# Patient Record
Sex: FEMALE | Race: WHITE | NOT HISPANIC OR LATINO | URBAN - METROPOLITAN AREA
[De-identification: names, ages, dates, MRNs, and addresses within clinical notes are randomized per-mention and may not be internally consistent; named-entity substitution may affect disease eponyms.]

---

## 2017-03-24 ENCOUNTER — OUTPATIENT (OUTPATIENT)
Dept: OUTPATIENT SERVICES | Facility: HOSPITAL | Age: 62
LOS: 1 days | End: 2017-03-24
Payer: COMMERCIAL

## 2017-03-24 VITALS
RESPIRATION RATE: 18 BRPM | OXYGEN SATURATION: 98 % | DIASTOLIC BLOOD PRESSURE: 86 MMHG | HEIGHT: 65 IN | SYSTOLIC BLOOD PRESSURE: 130 MMHG | WEIGHT: 126.1 LBS | TEMPERATURE: 98 F | HEART RATE: 61 BPM

## 2017-03-24 DIAGNOSIS — Z98.890 OTHER SPECIFIED POSTPROCEDURAL STATES: Chronic | ICD-10-CM

## 2017-03-24 DIAGNOSIS — Z01.818 ENCOUNTER FOR OTHER PREPROCEDURAL EXAMINATION: ICD-10-CM

## 2017-03-24 DIAGNOSIS — N83.8 OTHER NONINFLAMMATORY DISORDERS OF OVARY, FALLOPIAN TUBE AND BROAD LIGAMENT: Chronic | ICD-10-CM

## 2017-03-24 DIAGNOSIS — Z84.89 FAMILY HISTORY OF OTHER SPECIFIED CONDITIONS: Chronic | ICD-10-CM

## 2017-03-24 DIAGNOSIS — K40.90 UNILATERAL INGUINAL HERNIA, WITHOUT OBSTRUCTION OR GANGRENE, NOT SPECIFIED AS RECURRENT: ICD-10-CM

## 2017-03-24 DIAGNOSIS — Z90.81 ACQUIRED ABSENCE OF SPLEEN: Chronic | ICD-10-CM

## 2017-03-24 LAB
ANION GAP SERPL CALC-SCNC: 4 MMOL/L — LOW (ref 5–17)
BUN SERPL-MCNC: 12 MG/DL — SIGNIFICANT CHANGE UP (ref 7–23)
CALCIUM SERPL-MCNC: 9.1 MG/DL — SIGNIFICANT CHANGE UP (ref 8.4–10.5)
CHLORIDE SERPL-SCNC: 103 MMOL/L — SIGNIFICANT CHANGE UP (ref 96–108)
CO2 SERPL-SCNC: 35 MMOL/L — HIGH (ref 22–31)
CREAT SERPL-MCNC: 0.7 MG/DL — SIGNIFICANT CHANGE UP (ref 0.5–1.3)
GLUCOSE SERPL-MCNC: 107 MG/DL — HIGH (ref 70–99)
HCT VFR BLD CALC: 39.8 % — SIGNIFICANT CHANGE UP (ref 34.5–45)
HGB BLD-MCNC: 13 G/DL — SIGNIFICANT CHANGE UP (ref 11.5–15.5)
MCHC RBC-ENTMCNC: 30.4 PG — SIGNIFICANT CHANGE UP (ref 27–34)
MCHC RBC-ENTMCNC: 32.6 GM/DL — SIGNIFICANT CHANGE UP (ref 32–36)
MCV RBC AUTO: 93.1 FL — SIGNIFICANT CHANGE UP (ref 80–100)
PLATELET # BLD AUTO: 340 K/UL — SIGNIFICANT CHANGE UP (ref 150–400)
POTASSIUM SERPL-MCNC: 3.7 MMOL/L — SIGNIFICANT CHANGE UP (ref 3.5–5.3)
POTASSIUM SERPL-SCNC: 3.7 MMOL/L — SIGNIFICANT CHANGE UP (ref 3.5–5.3)
RBC # BLD: 4.28 M/UL — SIGNIFICANT CHANGE UP (ref 3.8–5.2)
RBC # FLD: 14.2 % — SIGNIFICANT CHANGE UP (ref 10.3–14.5)
SODIUM SERPL-SCNC: 142 MMOL/L — SIGNIFICANT CHANGE UP (ref 135–145)
WBC # BLD: 7.3 K/UL — SIGNIFICANT CHANGE UP (ref 3.8–10.5)
WBC # FLD AUTO: 7.3 K/UL — SIGNIFICANT CHANGE UP (ref 3.8–10.5)

## 2017-03-24 PROCEDURE — 93005 ELECTROCARDIOGRAM TRACING: CPT

## 2017-03-24 PROCEDURE — 36415 COLL VENOUS BLD VENIPUNCTURE: CPT

## 2017-03-24 PROCEDURE — 93010 ELECTROCARDIOGRAM REPORT: CPT | Mod: NC

## 2017-03-24 PROCEDURE — 80048 BASIC METABOLIC PNL TOTAL CA: CPT

## 2017-03-24 PROCEDURE — 85027 COMPLETE CBC AUTOMATED: CPT

## 2017-03-24 PROCEDURE — G0463: CPT

## 2017-03-24 RX ORDER — CLONAZEPAM 1 MG
1 TABLET ORAL
Qty: 0 | Refills: 0 | COMMUNITY

## 2017-03-24 NOTE — H&P PST ADULT - PMH
Acquired hypothyroidism    Anxiety    Hemochromatosis  last-2 months ago  Hiatal hernia    Inguinal hernia  right

## 2017-03-24 NOTE — H&P PST ADULT - NSANTHOSAYNRD_GEN_A_CORE
No. BELEM screening performed.  STOP BANG Legend: 0-2 = LOW Risk; 3-4 = INTERMEDIATE Risk; 5-8 = HIGH Risk

## 2017-03-24 NOTE — H&P PST ADULT - PROBLEM SELECTOR PLAN 1
scheduled open repair-right inguinal hernia repair-4/6/17  obtaining medical clearance  pre op instructions provided

## 2017-03-24 NOTE — H&P PST ADULT - PSH
Cyst of fallopian tube  excision 2012  Family history of breast cyst  bilateral removed-benign  History of splenectomy  s/p tumor  S/P arthroscopy of knee  bilateral

## 2017-03-31 RX ORDER — BUPIVACAINE HCL/PF 7.5 MG/ML
100 VIAL (ML) INJECTION
Qty: 0 | Refills: 0 | Status: DISCONTINUED | OUTPATIENT
Start: 2017-04-06 | End: 2017-04-06

## 2017-03-31 NOTE — ASU DISCHARGE PLAN (ADULT/PEDIATRIC). - INSTRUCTIONS
REST! Apply ice packs to incision sites 20 mins on, 20 mins off every 4 hours for first 24 hours.  If taking narcotic pain medications, may take COLACE (OTC stool softener) as directed to prevent constipation. Go with what you know! REST!  Apply ice packs to incision sites 30 mins on, 30 mins off every hour while awake for next 48 hours.

## 2017-03-31 NOTE — ASU DISCHARGE PLAN (ADULT/PEDIATRIC). - FOLLOWUP APPOINTMENT CLINIC/PHYSICIAN
Please call Dr. MG Holt's office ([phone]) to schedule a follow-up appointment to be seen in 7-10 days. Please call Dr. MG Holt's office ([phone]) to schedule a follow-up appointment to be seen in 2 weeks.

## 2017-03-31 NOTE — ASU DISCHARGE PLAN (ADULT/PEDIATRIC). - DRESSING FT
Remove pain pump catheter when empty (in 48 hours) and apply band-aid to insertion site. Please remove OUTER dressing WITH pump BEFORE first shower in 48 hours.

## 2017-03-31 NOTE — ASU DISCHARGE PLAN (ADULT/PEDIATRIC). - NOTIFY
Pain not relieved by Medications/Persistent Nausea and Vomiting/Fever greater than 101/Unable to Urinate/Bleeding that does not stop/Swelling that continues/Inability to Tolerate Liquids or Foods/Increased Irritability or Sluggishness

## 2017-03-31 NOTE — ASU DISCHARGE PLAN (ADULT/PEDIATRIC). - MEDICATION SUMMARY - MEDICATIONS TO TAKE
I will START or STAY ON the medications listed below when I get home from the hospital:    oxyCODONE 5 mg oral tablet  -- 1 tab(s) by mouth every 4 hours, As Needed MDD:6  -- Indication: For Treatment of incisional/ surgical site pain.    clonazePAM 0.5 mg oral tablet  -- 2 tab(s) by mouth 2 times a day, As Needed  -- Indication: For Continuation of home medication.    FLUoxetine 40 mg oral capsule  -- 1 cap(s) by mouth once a day  -- Indication: For Continuation of home medication.    NexIUM 40 mg oral delayed release capsule  -- 1 cap(s) by mouth once a day  -- Indication: For Continuation of home medication.    levothyroxine 125 mcg (0.125 mg) oral tablet  -- 1 tab(s) by mouth once a day  -- Indication: For Continuation of home medication.

## 2017-03-31 NOTE — ASU DISCHARGE PLAN (ADULT/PEDIATRIC). - SPECIAL INSTRUCTIONS
REST!  Ice packs, as needed.  May take Tylenol for minor discomfort.  Please avoid Aspirin, Advil, Motrin for next 48 hours.  A script for pain medication has been forwarded to your pharmacy.  Please take an over the counter stool softener such as COLACE twice daily until seen in office for follow-up.

## 2017-04-05 NOTE — ASU PATIENT PROFILE, ADULT - PSH
Cyst of fallopian tube  excision 2012  Family history of breast cyst  bilateral removed-benign  History of partial pancreatectomy    History of splenectomy  s/p tumor  S/P arthroscopy of knee  bilateral

## 2017-04-06 ENCOUNTER — OUTPATIENT (OUTPATIENT)
Dept: OUTPATIENT SERVICES | Facility: HOSPITAL | Age: 62
LOS: 1 days | End: 2017-04-06
Payer: COMMERCIAL

## 2017-04-06 VITALS
SYSTOLIC BLOOD PRESSURE: 109 MMHG | HEART RATE: 74 BPM | DIASTOLIC BLOOD PRESSURE: 54 MMHG | RESPIRATION RATE: 18 BRPM | OXYGEN SATURATION: 95 %

## 2017-04-06 VITALS
WEIGHT: 118.83 LBS | DIASTOLIC BLOOD PRESSURE: 65 MMHG | RESPIRATION RATE: 15 BRPM | HEART RATE: 58 BPM | TEMPERATURE: 98 F | HEIGHT: 65 IN | OXYGEN SATURATION: 100 % | SYSTOLIC BLOOD PRESSURE: 107 MMHG

## 2017-04-06 DIAGNOSIS — Z84.89 FAMILY HISTORY OF OTHER SPECIFIED CONDITIONS: Chronic | ICD-10-CM

## 2017-04-06 DIAGNOSIS — Z98.890 OTHER SPECIFIED POSTPROCEDURAL STATES: Chronic | ICD-10-CM

## 2017-04-06 DIAGNOSIS — Z90.411 ACQUIRED PARTIAL ABSENCE OF PANCREAS: Chronic | ICD-10-CM

## 2017-04-06 DIAGNOSIS — N83.8 OTHER NONINFLAMMATORY DISORDERS OF OVARY, FALLOPIAN TUBE AND BROAD LIGAMENT: Chronic | ICD-10-CM

## 2017-04-06 DIAGNOSIS — Z90.81 ACQUIRED ABSENCE OF SPLEEN: Chronic | ICD-10-CM

## 2017-04-06 DIAGNOSIS — K40.90 UNILATERAL INGUINAL HERNIA, WITHOUT OBSTRUCTION OR GANGRENE, NOT SPECIFIED AS RECURRENT: ICD-10-CM

## 2017-04-06 PROCEDURE — 49505 PRP I/HERN INIT REDUC >5 YR: CPT | Mod: RT

## 2017-04-06 PROCEDURE — C1781: CPT

## 2017-04-06 RX ORDER — SODIUM CHLORIDE 9 MG/ML
1000 INJECTION, SOLUTION INTRAVENOUS
Qty: 0 | Refills: 0 | Status: DISCONTINUED | OUTPATIENT
Start: 2017-04-06 | End: 2017-04-07

## 2017-04-06 RX ORDER — HYDROMORPHONE HYDROCHLORIDE 2 MG/ML
0.5 INJECTION INTRAMUSCULAR; INTRAVENOUS; SUBCUTANEOUS
Qty: 0 | Refills: 0 | Status: DISCONTINUED | OUTPATIENT
Start: 2017-04-06 | End: 2017-04-07

## 2017-04-06 RX ORDER — KETOROLAC TROMETHAMINE 30 MG/ML
30 SYRINGE (ML) INJECTION ONCE
Qty: 0 | Refills: 0 | Status: DISCONTINUED | OUTPATIENT
Start: 2017-04-06 | End: 2017-04-07

## 2017-04-06 RX ORDER — CEFAZOLIN SODIUM 1 G
2000 VIAL (EA) INJECTION ONCE
Qty: 0 | Refills: 0 | Status: COMPLETED | OUTPATIENT
Start: 2017-04-06 | End: 2017-04-06

## 2017-04-06 RX ORDER — OXYCODONE HYDROCHLORIDE 5 MG/1
1 TABLET ORAL
Qty: 30 | Refills: 0 | OUTPATIENT
Start: 2017-04-06

## 2017-04-07 ENCOUNTER — TRANSCRIPTION ENCOUNTER (OUTPATIENT)
Age: 62
End: 2017-04-07

## 2017-05-11 ENCOUNTER — APPOINTMENT (OUTPATIENT)
Dept: ENDOCRINOLOGY | Facility: CLINIC | Age: 62
End: 2017-05-11

## 2017-05-11 VITALS
HEART RATE: 68 BPM | DIASTOLIC BLOOD PRESSURE: 68 MMHG | SYSTOLIC BLOOD PRESSURE: 110 MMHG | HEIGHT: 65.5 IN | WEIGHT: 123 LBS | BODY MASS INDEX: 20.25 KG/M2 | OXYGEN SATURATION: 98 %

## 2017-09-11 ENCOUNTER — APPOINTMENT (OUTPATIENT)
Dept: SURGICAL ONCOLOGY | Facility: CLINIC | Age: 62
End: 2017-09-11
Payer: COMMERCIAL

## 2017-09-11 VITALS
DIASTOLIC BLOOD PRESSURE: 64 MMHG | SYSTOLIC BLOOD PRESSURE: 108 MMHG | OXYGEN SATURATION: 97 % | RESPIRATION RATE: 16 BRPM | HEIGHT: 65.5 IN | BODY MASS INDEX: 20.25 KG/M2 | HEART RATE: 64 BPM | WEIGHT: 123 LBS

## 2017-09-11 PROCEDURE — 99214 OFFICE O/P EST MOD 30 MIN: CPT

## 2017-11-07 ENCOUNTER — OUTPATIENT (OUTPATIENT)
Dept: OUTPATIENT SERVICES | Facility: HOSPITAL | Age: 62
LOS: 1 days | End: 2017-11-07
Payer: COMMERCIAL

## 2017-11-07 DIAGNOSIS — Z90.411 ACQUIRED PARTIAL ABSENCE OF PANCREAS: Chronic | ICD-10-CM

## 2017-11-07 DIAGNOSIS — Z90.81 ACQUIRED ABSENCE OF SPLEEN: Chronic | ICD-10-CM

## 2017-11-07 DIAGNOSIS — N83.8 OTHER NONINFLAMMATORY DISORDERS OF OVARY, FALLOPIAN TUBE AND BROAD LIGAMENT: Chronic | ICD-10-CM

## 2017-11-07 DIAGNOSIS — Z84.89 FAMILY HISTORY OF OTHER SPECIFIED CONDITIONS: Chronic | ICD-10-CM

## 2017-11-07 DIAGNOSIS — Z98.890 OTHER SPECIFIED POSTPROCEDURAL STATES: Chronic | ICD-10-CM

## 2017-11-07 DIAGNOSIS — M54.16 RADICULOPATHY, LUMBAR REGION: ICD-10-CM

## 2017-11-07 PROCEDURE — 62323 NJX INTERLAMINAR LMBR/SAC: CPT

## 2017-11-07 PROCEDURE — 77003 FLUOROGUIDE FOR SPINE INJECT: CPT

## 2018-01-29 ENCOUNTER — OUTPATIENT (OUTPATIENT)
Dept: OUTPATIENT SERVICES | Facility: HOSPITAL | Age: 63
LOS: 1 days | End: 2018-01-29
Payer: COMMERCIAL

## 2018-01-29 VITALS
RESPIRATION RATE: 16 BRPM | HEIGHT: 65 IN | HEART RATE: 56 BPM | TEMPERATURE: 98 F | DIASTOLIC BLOOD PRESSURE: 68 MMHG | WEIGHT: 130.07 LBS | SYSTOLIC BLOOD PRESSURE: 113 MMHG

## 2018-01-29 DIAGNOSIS — Z90.81 ACQUIRED ABSENCE OF SPLEEN: Chronic | ICD-10-CM

## 2018-01-29 DIAGNOSIS — S83.282A OTHER TEAR OF LATERAL MENISCUS, CURRENT INJURY, LEFT KNEE, INITIAL ENCOUNTER: ICD-10-CM

## 2018-01-29 DIAGNOSIS — Z90.411 ACQUIRED PARTIAL ABSENCE OF PANCREAS: Chronic | ICD-10-CM

## 2018-01-29 DIAGNOSIS — Z98.890 OTHER SPECIFIED POSTPROCEDURAL STATES: Chronic | ICD-10-CM

## 2018-01-29 DIAGNOSIS — N83.8 OTHER NONINFLAMMATORY DISORDERS OF OVARY, FALLOPIAN TUBE AND BROAD LIGAMENT: Chronic | ICD-10-CM

## 2018-01-29 DIAGNOSIS — Z84.89 FAMILY HISTORY OF OTHER SPECIFIED CONDITIONS: Chronic | ICD-10-CM

## 2018-01-29 DIAGNOSIS — Z01.818 ENCOUNTER FOR OTHER PREPROCEDURAL EXAMINATION: ICD-10-CM

## 2018-01-29 LAB
ALBUMIN SERPL ELPH-MCNC: 3.6 G/DL — SIGNIFICANT CHANGE UP (ref 3.3–5)
ALP SERPL-CCNC: 102 U/L — SIGNIFICANT CHANGE UP (ref 40–120)
ALT FLD-CCNC: 27 U/L — SIGNIFICANT CHANGE UP (ref 12–78)
ANION GAP SERPL CALC-SCNC: 7 MMOL/L — SIGNIFICANT CHANGE UP (ref 5–17)
AST SERPL-CCNC: 29 U/L — SIGNIFICANT CHANGE UP (ref 15–37)
BILIRUB SERPL-MCNC: 0.4 MG/DL — SIGNIFICANT CHANGE UP (ref 0.2–1.2)
BUN SERPL-MCNC: 11 MG/DL — SIGNIFICANT CHANGE UP (ref 7–23)
CALCIUM SERPL-MCNC: 9 MG/DL — SIGNIFICANT CHANGE UP (ref 8.5–10.1)
CHLORIDE SERPL-SCNC: 103 MMOL/L — SIGNIFICANT CHANGE UP (ref 96–108)
CO2 SERPL-SCNC: 31 MMOL/L — SIGNIFICANT CHANGE UP (ref 22–31)
CREAT SERPL-MCNC: 0.63 MG/DL — SIGNIFICANT CHANGE UP (ref 0.5–1.3)
GLUCOSE SERPL-MCNC: 99 MG/DL — SIGNIFICANT CHANGE UP (ref 70–99)
HCT VFR BLD CALC: 38.9 % — SIGNIFICANT CHANGE UP (ref 34.5–45)
HGB BLD-MCNC: 12.5 G/DL — SIGNIFICANT CHANGE UP (ref 11.5–15.5)
MCHC RBC-ENTMCNC: 30.2 PG — SIGNIFICANT CHANGE UP (ref 27–34)
MCHC RBC-ENTMCNC: 32.1 GM/DL — SIGNIFICANT CHANGE UP (ref 32–36)
MCV RBC AUTO: 94 FL — SIGNIFICANT CHANGE UP (ref 80–100)
PLATELET # BLD AUTO: 378 K/UL — SIGNIFICANT CHANGE UP (ref 150–400)
POTASSIUM SERPL-MCNC: 3.4 MMOL/L — LOW (ref 3.5–5.3)
POTASSIUM SERPL-SCNC: 3.4 MMOL/L — LOW (ref 3.5–5.3)
PROT SERPL-MCNC: 7.4 G/DL — SIGNIFICANT CHANGE UP (ref 6–8.3)
RBC # BLD: 4.14 M/UL — SIGNIFICANT CHANGE UP (ref 3.8–5.2)
RBC # FLD: 13.1 % — SIGNIFICANT CHANGE UP (ref 10.3–14.5)
SODIUM SERPL-SCNC: 141 MMOL/L — SIGNIFICANT CHANGE UP (ref 135–145)
WBC # BLD: 5.8 K/UL — SIGNIFICANT CHANGE UP (ref 3.8–10.5)
WBC # FLD AUTO: 5.8 K/UL — SIGNIFICANT CHANGE UP (ref 3.8–10.5)

## 2018-01-29 PROCEDURE — 93010 ELECTROCARDIOGRAM REPORT: CPT | Mod: NC

## 2018-01-29 PROCEDURE — 85027 COMPLETE CBC AUTOMATED: CPT

## 2018-01-29 PROCEDURE — 93005 ELECTROCARDIOGRAM TRACING: CPT

## 2018-01-29 PROCEDURE — 80053 COMPREHEN METABOLIC PANEL: CPT

## 2018-01-29 RX ORDER — LEVOTHYROXINE SODIUM 125 MCG
1 TABLET ORAL
Qty: 0 | Refills: 0 | COMMUNITY

## 2018-01-29 RX ORDER — ESOMEPRAZOLE MAGNESIUM 40 MG/1
1 CAPSULE, DELAYED RELEASE ORAL
Qty: 0 | Refills: 0 | COMMUNITY

## 2018-01-29 RX ORDER — CLONAZEPAM 1 MG
2 TABLET ORAL
Qty: 0 | Refills: 0 | COMMUNITY

## 2018-01-29 NOTE — H&P PST ADULT - PROBLEM SELECTOR PLAN 1
scheduled for a a left knee arthroscopy - possible partial meniscectomy and possible debridement with chondroplasty on 2/9 with Dr. Rojas.

## 2018-01-29 NOTE — H&P PST ADULT - ASSESSMENT
63 yo female with lateral tear of the left knee scheduled for a left knee arthroscopy - possible partial meniscectomy and possible debridement with chondroplasty on 2/9 with Dr. Rojas.

## 2018-01-29 NOTE — H&P PST ADULT - PROBLEM SELECTOR PLAN 2
Labs CBC, CMP and EKG  MC with Sisselman  Pre op and Hibiclens instructions reviewed and given. Take routine am meds DOS with sip of water. Avoid NSAIDs and OTC supplements. Verbalized understanding

## 2018-01-29 NOTE — H&P PST ADULT - PSH
Cyst of fallopian tube  excision 2012  Family history of breast cyst  bilateral removed-benign  H/O right inguinal hernia repair  2017  History of partial pancreatectomy    History of splenectomy  s/p tumor  S/P arthroscopy of knee  bilateral

## 2018-01-29 NOTE — H&P PST ADULT - PMH
Acquired hypothyroidism    Anxiety    Hemochromatosis    Hiatal hernia    Inguinal hernia  right  Other tear of lateral meniscus, current injury, left knee, initial encounter    Pancreatic tumor Acquired hypothyroidism    Anxiety    Chronic low back pain, unspecified back pain laterality, with sciatica presence unspecified    Hemochromatosis  Last phlebotomy 3 mos ago  Hiatal hernia    Inguinal hernia  right  Lumbar herniated disc  Last epiural shot Dec 2017  Other tear of lateral meniscus, current injury, left knee, initial encounter    Pancreatic tumor    Scoliosis, unspecified scoliosis type, unspecified spinal region

## 2018-01-29 NOTE — H&P PST ADULT - HISTORY OF PRESENT ILLNESS
53 yo female with H/O stable hemocromotosis, last phlebotomy 3 mos ago, last iron level was 15, presents to PST with left knee pain. 53 yo female with H/O stable hemochromatosis last phlebotomy 3 mos ago, last iron level was 15, presents to PST with H/O left knee pain, scheduled for a a left knee arthroscopy - possible partial meniscectomy and possible debridement with chondroplasty on 2/9 with Dr. Rojas.

## 2018-02-07 DIAGNOSIS — S83.282A OTHER TEAR OF LATERAL MENISCUS, CURRENT INJURY, LEFT KNEE, INITIAL ENCOUNTER: ICD-10-CM

## 2018-02-07 DIAGNOSIS — Z01.818 ENCOUNTER FOR OTHER PREPROCEDURAL EXAMINATION: ICD-10-CM

## 2018-02-07 DIAGNOSIS — E83.119 HEMOCHROMATOSIS, UNSPECIFIED: ICD-10-CM

## 2018-02-08 RX ORDER — SODIUM CHLORIDE 9 MG/ML
1000 INJECTION, SOLUTION INTRAVENOUS
Qty: 0 | Refills: 0 | Status: DISCONTINUED | OUTPATIENT
Start: 2018-02-09 | End: 2018-02-09

## 2018-02-08 RX ORDER — ACETAMINOPHEN 500 MG
975 TABLET ORAL ONCE
Qty: 0 | Refills: 0 | Status: COMPLETED | OUTPATIENT
Start: 2018-02-09 | End: 2018-02-09

## 2018-02-08 RX ORDER — SODIUM CHLORIDE 9 MG/ML
3 INJECTION INTRAMUSCULAR; INTRAVENOUS; SUBCUTANEOUS ONCE
Qty: 0 | Refills: 0 | Status: DISCONTINUED | OUTPATIENT
Start: 2018-02-09 | End: 2018-02-24

## 2018-02-09 ENCOUNTER — RESULT REVIEW (OUTPATIENT)
Age: 63
End: 2018-02-09

## 2018-02-09 ENCOUNTER — OUTPATIENT (OUTPATIENT)
Dept: OUTPATIENT SERVICES | Facility: HOSPITAL | Age: 63
LOS: 1 days | End: 2018-02-09
Payer: COMMERCIAL

## 2018-02-09 ENCOUNTER — TRANSCRIPTION ENCOUNTER (OUTPATIENT)
Age: 63
End: 2018-02-09

## 2018-02-09 VITALS
SYSTOLIC BLOOD PRESSURE: 111 MMHG | HEART RATE: 66 BPM | RESPIRATION RATE: 14 BRPM | DIASTOLIC BLOOD PRESSURE: 49 MMHG | HEIGHT: 65 IN | TEMPERATURE: 98 F | WEIGHT: 130.07 LBS | OXYGEN SATURATION: 100 %

## 2018-02-09 VITALS — HEART RATE: 64 BPM

## 2018-02-09 DIAGNOSIS — Z84.89 FAMILY HISTORY OF OTHER SPECIFIED CONDITIONS: Chronic | ICD-10-CM

## 2018-02-09 DIAGNOSIS — Z98.890 OTHER SPECIFIED POSTPROCEDURAL STATES: Chronic | ICD-10-CM

## 2018-02-09 DIAGNOSIS — S83.282A OTHER TEAR OF LATERAL MENISCUS, CURRENT INJURY, LEFT KNEE, INITIAL ENCOUNTER: ICD-10-CM

## 2018-02-09 DIAGNOSIS — Z90.81 ACQUIRED ABSENCE OF SPLEEN: Chronic | ICD-10-CM

## 2018-02-09 DIAGNOSIS — Z90.411 ACQUIRED PARTIAL ABSENCE OF PANCREAS: Chronic | ICD-10-CM

## 2018-02-09 DIAGNOSIS — N83.8 OTHER NONINFLAMMATORY DISORDERS OF OVARY, FALLOPIAN TUBE AND BROAD LIGAMENT: Chronic | ICD-10-CM

## 2018-02-09 PROCEDURE — 88304 TISSUE EXAM BY PATHOLOGIST: CPT

## 2018-02-09 PROCEDURE — 88304 TISSUE EXAM BY PATHOLOGIST: CPT | Mod: 26

## 2018-02-09 PROCEDURE — 29880 ARTHRS KNE SRG MNISECTMY M&L: CPT | Mod: LT

## 2018-02-09 RX ORDER — CLONAZEPAM 1 MG
1 TABLET ORAL
Qty: 0 | Refills: 0 | COMMUNITY

## 2018-02-09 RX ORDER — FLUOXETINE HCL 10 MG
1 CAPSULE ORAL
Qty: 0 | Refills: 0 | COMMUNITY

## 2018-02-09 RX ORDER — HYDROMORPHONE HYDROCHLORIDE 2 MG/ML
0.5 INJECTION INTRAMUSCULAR; INTRAVENOUS; SUBCUTANEOUS
Qty: 0 | Refills: 0 | Status: DISCONTINUED | OUTPATIENT
Start: 2018-02-09 | End: 2018-02-09

## 2018-02-09 RX ORDER — ESOMEPRAZOLE MAGNESIUM 40 MG/1
1 CAPSULE, DELAYED RELEASE ORAL
Qty: 0 | Refills: 0 | COMMUNITY

## 2018-02-09 RX ORDER — KETOROLAC TROMETHAMINE 30 MG/ML
30 SYRINGE (ML) INJECTION ONCE
Qty: 0 | Refills: 0 | Status: DISCONTINUED | OUTPATIENT
Start: 2018-02-09 | End: 2018-02-09

## 2018-02-09 RX ORDER — CEFAZOLIN SODIUM 1 G
2000 VIAL (EA) INJECTION ONCE
Qty: 0 | Refills: 0 | Status: COMPLETED | OUTPATIENT
Start: 2018-02-09 | End: 2018-02-09

## 2018-02-09 RX ORDER — SODIUM CHLORIDE 9 MG/ML
1000 INJECTION, SOLUTION INTRAVENOUS
Qty: 0 | Refills: 0 | Status: DISCONTINUED | OUTPATIENT
Start: 2018-02-09 | End: 2018-02-09

## 2018-02-09 RX ORDER — LEVOTHYROXINE SODIUM 125 MCG
1 TABLET ORAL
Qty: 0 | Refills: 0 | COMMUNITY

## 2018-02-09 RX ORDER — OXYCODONE HYDROCHLORIDE 5 MG/1
5 TABLET ORAL EVERY 4 HOURS
Qty: 0 | Refills: 0 | Status: DISCONTINUED | OUTPATIENT
Start: 2018-02-09 | End: 2018-02-09

## 2018-02-09 RX ORDER — ASPIRIN/CALCIUM CARB/MAGNESIUM 324 MG
1 TABLET ORAL
Qty: 0 | Refills: 0 | COMMUNITY
Start: 2018-02-09 | End: 2018-02-23

## 2018-02-09 RX ADMIN — SODIUM CHLORIDE 30 MILLILITER(S): 9 INJECTION, SOLUTION INTRAVENOUS at 08:08

## 2018-02-09 RX ADMIN — SODIUM CHLORIDE 30 MILLILITER(S): 9 INJECTION, SOLUTION INTRAVENOUS at 08:44

## 2018-02-09 RX ADMIN — HYDROMORPHONE HYDROCHLORIDE 0.5 MILLIGRAM(S): 2 INJECTION INTRAMUSCULAR; INTRAVENOUS; SUBCUTANEOUS at 10:01

## 2018-02-09 RX ADMIN — HYDROMORPHONE HYDROCHLORIDE 0.5 MILLIGRAM(S): 2 INJECTION INTRAMUSCULAR; INTRAVENOUS; SUBCUTANEOUS at 10:28

## 2018-02-09 RX ADMIN — HYDROMORPHONE HYDROCHLORIDE 0.5 MILLIGRAM(S): 2 INJECTION INTRAMUSCULAR; INTRAVENOUS; SUBCUTANEOUS at 10:15

## 2018-02-09 RX ADMIN — Medication 975 MILLIGRAM(S): at 08:44

## 2018-02-09 RX ADMIN — HYDROMORPHONE HYDROCHLORIDE 0.5 MILLIGRAM(S): 2 INJECTION INTRAMUSCULAR; INTRAVENOUS; SUBCUTANEOUS at 10:17

## 2018-02-09 RX ADMIN — SODIUM CHLORIDE 100 MILLILITER(S): 9 INJECTION, SOLUTION INTRAVENOUS at 10:03

## 2018-02-09 RX ADMIN — Medication 30 MILLIGRAM(S): at 10:31

## 2018-02-09 NOTE — ASU PATIENT PROFILE, ADULT - PMH
Acquired hypothyroidism    Anxiety    Chronic low back pain, unspecified back pain laterality, with sciatica presence unspecified    Hemochromatosis  Last phlebotomy 3 mos ago  Hiatal hernia    Inguinal hernia  right  Lumbar herniated disc  Last epiural shot Dec 2017  Other tear of lateral meniscus, current injury, left knee, initial encounter    Pancreatic tumor    Scoliosis, unspecified scoliosis type, unspecified spinal region

## 2018-02-09 NOTE — ASU DISCHARGE PLAN (ADULT/PEDIATRIC). - ACTIVITY LEVEL
weight bearing as tolerated/no exercise/WBAT LLE/no heavy lifting WBAT LLE9 (weight bearing as tolerated)/no exercise/no heavy lifting/weight bearing as tolerated

## 2018-02-09 NOTE — ASU DISCHARGE PLAN (ADULT/PEDIATRIC). - MEDICATION SUMMARY - MEDICATIONS TO TAKE
I will START or STAY ON the medications listed below when I get home from the hospital:    aspirin 81 mg oral tablet  -- 1 tab(s) by mouth once a day for dvt prophylaxis  -- Indication: For dvt prophylaxis for 2 weeks    KlonoPIN 1 mg oral tablet  -- 1 tab(s) by mouth once a day  -- Indication: For prior home med    KlonoPIN 1 mg oral tablet  -- 1 tab(s) by mouth once a day (at bedtime)- M- W- F   -- Indication: For prior home med    FLUoxetine 40 mg oral capsule  -- 1 cap(s) by mouth once a day  -- Indication: For prior home med    NexIUM 40 mg oral delayed release capsule  -- 1 cap(s) by mouth every other day  -- Indication: For prior home med    levothyroxine 125 mcg (0.125 mg) oral tablet  -- 1 tab(s) by mouth 6 times a week- Mon- Sat  -- Indication: For prior home med

## 2018-02-12 LAB — SURGICAL PATHOLOGY FINAL REPORT - CH: SIGNIFICANT CHANGE UP

## 2018-05-24 ENCOUNTER — APPOINTMENT (OUTPATIENT)
Dept: ENDOCRINOLOGY | Facility: CLINIC | Age: 63
End: 2018-05-24
Payer: COMMERCIAL

## 2018-05-24 ENCOUNTER — APPOINTMENT (OUTPATIENT)
Dept: ENDOCRINOLOGY | Facility: CLINIC | Age: 63
End: 2018-05-24

## 2018-05-24 VITALS
OXYGEN SATURATION: 98 % | HEIGHT: 65.5 IN | HEART RATE: 79 BPM | SYSTOLIC BLOOD PRESSURE: 104 MMHG | WEIGHT: 137 LBS | DIASTOLIC BLOOD PRESSURE: 74 MMHG | BODY MASS INDEX: 22.55 KG/M2

## 2018-05-24 PROCEDURE — 99214 OFFICE O/P EST MOD 30 MIN: CPT

## 2018-05-24 RX ORDER — HYDROCODONE BITARTRATE AND ACETAMINOPHEN 5; 325 MG/1; MG/1
5-325 TABLET ORAL
Qty: 20 | Refills: 0 | Status: COMPLETED | COMMUNITY
Start: 2018-02-08

## 2018-05-29 ENCOUNTER — LABORATORY RESULT (OUTPATIENT)
Age: 63
End: 2018-05-29

## 2018-05-30 LAB
ALBUMIN SERPL ELPH-MCNC: 3.9 G/DL
ALP BLD-CCNC: 86 U/L
ALT SERPL-CCNC: 20 U/L
ANION GAP SERPL CALC-SCNC: 12 MMOL/L
AST SERPL-CCNC: 31 U/L
BASOPHILS # BLD AUTO: 0.09 K/UL
BASOPHILS NFR BLD AUTO: 1.2 %
BILIRUB SERPL-MCNC: 0.4 MG/DL
BUN SERPL-MCNC: 10 MG/DL
CALCIUM SERPL-MCNC: 9.5 MG/DL
CHLORIDE SERPL-SCNC: 100 MMOL/L
CO2 SERPL-SCNC: 27 MMOL/L
CREAT SERPL-MCNC: 0.77 MG/DL
EOSINOPHIL # BLD AUTO: 0.21 K/UL
EOSINOPHIL NFR BLD AUTO: 2.7 %
FRUCTOSAMINE SERPL-MCNC: 255 UMOL/L
GLUCOSE SERPL-MCNC: 151 MG/DL
HBA1C MFR BLD HPLC: 6.1 %
HCT VFR BLD CALC: 37.6 %
HGB BLD-MCNC: 12.3 G/DL
IMM GRANULOCYTES NFR BLD AUTO: 0.3 %
LYMPHOCYTES # BLD AUTO: 3.31 K/UL
LYMPHOCYTES NFR BLD AUTO: 42.5 %
MAGNESIUM SERPL-MCNC: 1.9 MG/DL
MAN DIFF?: NORMAL
MCHC RBC-ENTMCNC: 30.2 PG
MCHC RBC-ENTMCNC: 32.7 GM/DL
MCV RBC AUTO: 92.4 FL
MONOCYTES # BLD AUTO: 1 K/UL
MONOCYTES NFR BLD AUTO: 12.8 %
NEUTROPHILS # BLD AUTO: 3.16 K/UL
NEUTROPHILS NFR BLD AUTO: 40.5 %
PLATELET # BLD AUTO: 383 K/UL
POTASSIUM SERPL-SCNC: 4.1 MMOL/L
PROT SERPL-MCNC: 6.7 G/DL
RBC # BLD: 4.07 M/UL
RBC # FLD: 16.3 %
SODIUM SERPL-SCNC: 139 MMOL/L
T3RU NFR SERPL: 0.96 INDEX
T4 SERPL-MCNC: 7.7 UG/DL
TSH SERPL-ACNC: 0.82 UIU/ML
WBC # FLD AUTO: 7.79 K/UL

## 2018-07-16 PROBLEM — E83.119 HEMOCHROMATOSIS, UNSPECIFIED: Chronic | Status: ACTIVE | Noted: 2017-03-24

## 2018-07-17 PROBLEM — M51.26 OTHER INTERVERTEBRAL DISC DISPLACEMENT, LUMBAR REGION: Chronic | Status: ACTIVE | Noted: 2018-02-07

## 2018-08-21 ENCOUNTER — RESULT REVIEW (OUTPATIENT)
Age: 63
End: 2018-08-21

## 2018-08-21 PROBLEM — F41.9 ANXIETY DISORDER, UNSPECIFIED: Chronic | Status: ACTIVE | Noted: 2017-03-24

## 2018-08-21 PROBLEM — K44.9 DIAPHRAGMATIC HERNIA WITHOUT OBSTRUCTION OR GANGRENE: Chronic | Status: ACTIVE | Noted: 2017-03-24

## 2018-08-21 PROBLEM — S83.282A OTHER TEAR OF LATERAL MENISCUS, CURRENT INJURY, LEFT KNEE, INITIAL ENCOUNTER: Chronic | Status: ACTIVE | Noted: 2018-01-29

## 2018-08-21 PROBLEM — D49.0 NEOPLASM OF UNSPECIFIED BEHAVIOR OF DIGESTIVE SYSTEM: Chronic | Status: ACTIVE | Noted: 2018-01-29

## 2018-08-21 PROBLEM — M54.5 LOW BACK PAIN: Chronic | Status: ACTIVE | Noted: 2018-02-07

## 2018-08-21 PROBLEM — K40.90 UNILATERAL INGUINAL HERNIA, WITHOUT OBSTRUCTION OR GANGRENE, NOT SPECIFIED AS RECURRENT: Chronic | Status: ACTIVE | Noted: 2017-03-24

## 2018-08-21 PROBLEM — M41.9 SCOLIOSIS, UNSPECIFIED: Chronic | Status: ACTIVE | Noted: 2018-02-07

## 2018-08-21 PROBLEM — E03.9 HYPOTHYROIDISM, UNSPECIFIED: Chronic | Status: ACTIVE | Noted: 2017-03-24

## 2018-09-18 ENCOUNTER — APPOINTMENT (OUTPATIENT)
Dept: SURGICAL ONCOLOGY | Facility: CLINIC | Age: 63
End: 2018-09-18
Payer: COMMERCIAL

## 2018-09-18 VITALS
HEART RATE: 64 BPM | WEIGHT: 130 LBS | SYSTOLIC BLOOD PRESSURE: 111 MMHG | HEIGHT: 65.5 IN | DIASTOLIC BLOOD PRESSURE: 66 MMHG | RESPIRATION RATE: 16 BRPM | BODY MASS INDEX: 21.4 KG/M2

## 2018-09-18 DIAGNOSIS — R92.0 MAMMOGRAPHIC MICROCALCIFICATION FOUND ON DIAGNOSTIC IMAGING OF BREAST: ICD-10-CM

## 2018-09-18 PROCEDURE — 99214 OFFICE O/P EST MOD 30 MIN: CPT

## 2019-03-02 NOTE — ASU DISCHARGE PLAN (ADULT/PEDIATRIC). - NOTIFY
REBECA SALEH  : 1933  10/10/17 16:03:06  ACCOUNT:  [id number]  HOME PHONE:  [phone]  WORK PHONE:  [phone]  Pt wife called stating pt is currently in [facility] ICU.    pt was transferred by ambulance to [facility] for a hgb 7.0 pt had an EGD and identified a small bleeding ulcer that was cauterized.  pt became severely hypotensive per pt wife and unconscious for 5 days. pt is currently awake and able to recognize family. pt is unable to talk or swallow due to muscles in throat paralyzed.  Pt is advised to get a feeding tube and pt and wife do not want a feeding tube. The only other option they are told at this point is Hospice.    Pt wife wondering if they should get a second opinion. She has not asked for a second opinion at [facility]. She will try to ask if another doctor can do a consult. she is not sure if pt is stable enough to transfer to ward.    she would like Dr. Mckay opinion on what is happening.  I did tell her I will review with Dr. Mckay and try to get hosp records from [facility]. It will be hard for Dr. Mckay to advise her further without hosp records and being able to see pt. she understood this. lc    
Fever greater than 101/Numbness, color, or temperature change to extremity/Bleeding that does not stop/Pain not relieved by Medications/Numbness, tingling

## 2019-06-29 NOTE — H&P PST ADULT - CARDIOVASCULAR
Patient with frequent near syncopal episodes with associated right-sided weakness. Also with reported RLE weakness in the past 2 months. Likely due to combination of underlying POTS and small fiber neuropathy.   CT head with no evidence of infarct, intracranial hemorrhage. EKG showing NSR  Orthostatics - negative on admission  -Will give IV fluid hydration w/ LR (Patient infusions 2 L of Lactated Ringer's daily via PICC line at home)   -Management of small fiber neuropathy and POTS as below negative Regular rate & rhythm, normal S1, S2; no murmurs, gallops or rubs; no S3, S4

## 2019-09-10 ENCOUNTER — APPOINTMENT (OUTPATIENT)
Dept: SURGICAL ONCOLOGY | Facility: CLINIC | Age: 64
End: 2019-09-10
Payer: COMMERCIAL

## 2019-09-10 VITALS
DIASTOLIC BLOOD PRESSURE: 77 MMHG | HEART RATE: 74 BPM | WEIGHT: 120 LBS | BODY MASS INDEX: 19.67 KG/M2 | SYSTOLIC BLOOD PRESSURE: 122 MMHG | OXYGEN SATURATION: 96 % | RESPIRATION RATE: 16 BRPM

## 2019-09-10 PROCEDURE — 99213 OFFICE O/P EST LOW 20 MIN: CPT

## 2019-09-10 NOTE — ADDENDUM
[FreeTextEntry1] : I, Santy Diego, acted soley as a scribe for Dr. Abraham Sanderson on 09/10/2019\par

## 2019-09-10 NOTE — HISTORY OF PRESENT ILLNESS
[de-identified] : 62 y/o female presents for a f/u visit. \par \par MMG/US on 12/5/18\par There are benign findings. Dense breasts R92.2. Sonogram shows benign findings as well. Benign cysts N60.11, N60.12\par Birads 2 Benign

## 2019-09-10 NOTE — PHYSICAL EXAM
[Normal] : supple, no neck mass and thyroid not enlarged [Normal Neck Lymph Nodes] : normal neck lymph nodes  [Normal Groin Lymph Nodes] : normal groin lymph nodes [Normal] : oriented to person, place and time, with appropriate affect [FreeTextEntry1] : MA MM was present for the exam\par  [de-identified] : Normal S1,S2. Regular rate and rhythm [de-identified] : Complete bilateral breast examination performed supine and upright revealed no palpable masses, tenderness ,nipple discharge, inversion ,deviation or enlarged axillary or supraclavicular lymph node. [de-identified] : Clear breath sounds bilaterally, normal respiratory effort\par \par

## 2019-09-10 NOTE — ASSESSMENT
[FreeTextEntry1] : Impression No evidence of new or recurrent lesions. Breast imaging failed to identify any suspicious lesions. No suspicious lesions on exam.\par \par \par \par \par Plan: \par Continue yearly surveillance with routine MMG and US. \par \par \par

## 2019-12-11 ENCOUNTER — LABORATORY RESULT (OUTPATIENT)
Age: 64
End: 2019-12-11

## 2019-12-11 ENCOUNTER — APPOINTMENT (OUTPATIENT)
Dept: ENDOCRINOLOGY | Facility: CLINIC | Age: 64
End: 2019-12-11
Payer: COMMERCIAL

## 2019-12-11 VITALS
DIASTOLIC BLOOD PRESSURE: 72 MMHG | BODY MASS INDEX: 20.41 KG/M2 | WEIGHT: 124 LBS | SYSTOLIC BLOOD PRESSURE: 110 MMHG | HEART RATE: 64 BPM | HEIGHT: 65.5 IN | OXYGEN SATURATION: 8 %

## 2019-12-11 PROCEDURE — 99214 OFFICE O/P EST MOD 30 MIN: CPT

## 2019-12-11 NOTE — PHYSICAL EXAM
[Alert] : alert [No Acute Distress] : no acute distress [Well Nourished] : well nourished [Well Developed] : well developed [Normal Sclera/Conjunctiva] : normal sclera/conjunctiva [Normal Oropharynx] : the oropharynx was normal [Thyroid Not Enlarged] : the thyroid was not enlarged [No Proptosis] : no proptosis [No Thyroid Nodules] : there were no palpable thyroid nodules [No Accessory Muscle Use] : no accessory muscle use [No Respiratory Distress] : no respiratory distress [Normal S1, S2] : normal S1 and S2 [Normal Rate] : heart rate was normal  [Clear to Auscultation] : lungs were clear to auscultation bilaterally [Pedal Pulses Normal] : the pedal pulses are present [Regular Rhythm] : with a regular rhythm [Normal Bowel Sounds] : normal bowel sounds [No Edema] : there was no peripheral edema [Not Distended] : not distended [Soft] : abdomen soft [Post Cervical Nodes] : posterior cervical nodes [Not Tender] : non-tender [Normal] : normal and non tender [Axillary Nodes] : axillary nodes [Anterior Cervical Nodes] : anterior cervical nodes [No Stigmata of Cushings Syndrome] : no stigmata of cushings syndrome [Spine Straight] : spine straight [No Spinal Tenderness] : no spinal tenderness [No Rash] : no rash [Normal Strength/Tone] : muscle strength and tone were normal [Normal Gait] : normal gait [Normal Reflexes] : deep tendon reflexes were 2+ and symmetric [No Tremors] : no tremors [Oriented x3] : oriented to person, place, and time

## 2019-12-11 NOTE — REVIEW OF SYSTEMS
[All other systems negative] : All other systems negative [Recent Weight Loss (___ Lbs)] : recent [unfilled] ~Ulb weight loss

## 2019-12-12 LAB
ALBUMIN SERPL ELPH-MCNC: 4.3 G/DL
ALP BLD-CCNC: 87 U/L
ALT SERPL-CCNC: 19 U/L
ANION GAP SERPL CALC-SCNC: 12 MMOL/L
AST SERPL-CCNC: 31 U/L
BILIRUB SERPL-MCNC: 0.3 MG/DL
BUN SERPL-MCNC: 11 MG/DL
CALCIUM SERPL-MCNC: 9.6 MG/DL
CHLORIDE SERPL-SCNC: 98 MMOL/L
CO2 SERPL-SCNC: 30 MMOL/L
CREAT SERPL-MCNC: 0.7 MG/DL
GLUCOSE SERPL-MCNC: 80 MG/DL
POTASSIUM SERPL-SCNC: 3.9 MMOL/L
PROT SERPL-MCNC: 7 G/DL
SODIUM SERPL-SCNC: 140 MMOL/L
T3RU NFR SERPL: 0.9 TBI
T4 SERPL-MCNC: 8.3 UG/DL
TSH SERPL-ACNC: 1.15 UIU/ML

## 2019-12-12 NOTE — ASSESSMENT
[Levothyroxine] : The patient was instructed to take Levothyroxine on an empty stomach, separate from vitamins, and wait at least 30 minutes before eating [FreeTextEntry1] : 63 yo female with hypothyroidism\par \par Pt is on  LT4 125 6d/wk. No sx of hypo or hyperthyroidism. No local neck pain. No dysphagia or dysphonia. No raciness, shakiness, heat/cold intolerance, tiredness, or fatigue.\par \par Prediabetes, A1c 6.0%.prior 6.2, 6.1. Changed diet, lost about 20 lbs. No FHx diabetes.\par \par Request labs sent out.\par \par f/u in 1 year

## 2019-12-12 NOTE — END OF VISIT
[FreeTextEntry3] : I, Armando Martinez, authored this note working as a medical scribe for Dr. Arenas.  12/11/2019.  3:15PM. This note was authored by the medical scribe for me. I have reviewed, edited, and revised the note as needed. I am in agreement with the exam findings, imaging findings, and treatment plan.  Oliver Arenas MD

## 2019-12-12 NOTE — HISTORY OF PRESENT ILLNESS
[FreeTextEntry1] : f/u 65 yo female w/ hypothyroidism. \par \par Pt is on  LT4 125 6d/wk. No sx of hypo or hyperthyroidism. No local neck pain. No dysphagia or dysphonia. No raciness, shakiness, heat/cold intolerance, tiredness, or fatigue.\par \par Occasional palpitations, had EKG, echo normal. Had benign breast bx. Menopausal x 2 years. Mod hat flashes. No BMD\par  \par Dx: hemochromatosis, began phlebotomy spring 2016, no recent phlebotomy. told of low blood iron but high liver iron level. sees Dr Collins. Hematology Dr. Rudolph\par \par Prediabetes, A1c 6.0%.prior 6.2, 6.1. Changed diet, lost about 20 lbs. No FHx diabetes. \par \par Also told of right inguinal hernia, discussing surgery. Left knee arthroscopy.

## 2020-09-15 ENCOUNTER — APPOINTMENT (OUTPATIENT)
Dept: SURGICAL ONCOLOGY | Facility: CLINIC | Age: 65
End: 2020-09-15
Payer: MEDICARE

## 2020-09-15 VITALS
WEIGHT: 124 LBS | BODY MASS INDEX: 20.41 KG/M2 | OXYGEN SATURATION: 98 % | HEIGHT: 65.5 IN | HEART RATE: 64 BPM | SYSTOLIC BLOOD PRESSURE: 125 MMHG | DIASTOLIC BLOOD PRESSURE: 80 MMHG

## 2020-09-15 PROCEDURE — 99213 OFFICE O/P EST LOW 20 MIN: CPT

## 2020-09-16 NOTE — HISTORY OF PRESENT ILLNESS
[de-identified] : 63 y/o female presents for a f/u visit. \par \par MMG/US on 12/30/19: No evidence of malignancy.\par Mammogram showed the breast are heterogeneously dense. Benign appearing calcifications are again appreciated in both breasts. Sonogram showed bilateral fibrocystic changes, slight fluctuation over time. BI-RADS 2. \par \par Today the pt was w/o any complaints. Denies palpable breast masses, nipple discharge, skin changes, inversion of breast pain. Denies constitutional symptoms\par

## 2020-09-16 NOTE — ASSESSMENT
[FreeTextEntry1] : Impression:\par No evidence of new or recurrent lesions. Breast imaging failed to identify any suspicious lesions. No suspicious lesions on exam.\par \par \par \par Plan: \par Continue yearly surveillance \par  MMG and Sonogram 12/2020\par \par \par

## 2020-09-16 NOTE — ADDENDUM
[FreeTextEntry1] : I, Trisha Wilder, acted soley as a scribe for Dr. Abraham Sanderson on this date 09/15/2020.

## 2020-09-16 NOTE — PHYSICAL EXAM
[Normal] : supple, no neck mass and thyroid not enlarged [Normal Supraclavicular Lymph Nodes] : normal supraclavicular lymph nodes [Normal Axillary Lymph Nodes] : normal axillary lymph nodes [Normal] : oriented to person, place and time, with appropriate affect [FreeTextEntry1] : \par ITrisha was present for the physical exam.  [de-identified] : Complete bilateral breast examination performed supine and upright revealed no palpable masses, tenderness ,nipple discharge, inversion ,deviation or enlarged axillary or supraclavicular lymph node. [de-identified] : Normal S1,S2. Regular rate and rhythm [de-identified] : Clear breath sounds bilaterally, normal respiratory effort\par \par

## 2020-11-17 ENCOUNTER — RX RENEWAL (OUTPATIENT)
Age: 65
End: 2020-11-17

## 2020-12-02 ENCOUNTER — LABORATORY RESULT (OUTPATIENT)
Age: 65
End: 2020-12-02

## 2020-12-02 ENCOUNTER — APPOINTMENT (OUTPATIENT)
Dept: ENDOCRINOLOGY | Facility: CLINIC | Age: 65
End: 2020-12-02
Payer: MEDICARE

## 2020-12-02 VITALS
SYSTOLIC BLOOD PRESSURE: 114 MMHG | HEART RATE: 76 BPM | DIASTOLIC BLOOD PRESSURE: 70 MMHG | TEMPERATURE: 97.9 F | OXYGEN SATURATION: 98 % | WEIGHT: 136 LBS | HEIGHT: 65.5 IN | BODY MASS INDEX: 22.39 KG/M2

## 2020-12-02 PROCEDURE — 99214 OFFICE O/P EST MOD 30 MIN: CPT

## 2020-12-03 LAB
ALBUMIN SERPL ELPH-MCNC: 4.2 G/DL
ALP BLD-CCNC: 82 U/L
ALT SERPL-CCNC: 22 U/L
ANION GAP SERPL CALC-SCNC: 9 MMOL/L
AST SERPL-CCNC: 35 U/L
BASOPHILS # BLD AUTO: 0.11 K/UL
BASOPHILS NFR BLD AUTO: 1.5 %
BILIRUB SERPL-MCNC: 0.3 MG/DL
BUN SERPL-MCNC: 13 MG/DL
CALCIUM SERPL-MCNC: 10 MG/DL
CHLORIDE SERPL-SCNC: 100 MMOL/L
CO2 SERPL-SCNC: 31 MMOL/L
CREAT SERPL-MCNC: 0.7 MG/DL
EOSINOPHIL # BLD AUTO: 0.16 K/UL
EOSINOPHIL NFR BLD AUTO: 2.2 %
ESTIMATED AVERAGE GLUCOSE: 128 MG/DL
FERRITIN SERPL-MCNC: 13 NG/ML
GLUCOSE SERPL-MCNC: 93 MG/DL
HBA1C MFR BLD HPLC: 6.1 %
HCT VFR BLD CALC: 40.3 %
HGB BLD-MCNC: 13.1 G/DL
IMM GRANULOCYTES NFR BLD AUTO: 0.1 %
LYMPHOCYTES # BLD AUTO: 3.12 K/UL
LYMPHOCYTES NFR BLD AUTO: 42.2 %
MAN DIFF?: NORMAL
MCHC RBC-ENTMCNC: 31 PG
MCHC RBC-ENTMCNC: 32.5 GM/DL
MCV RBC AUTO: 95.3 FL
MONOCYTES # BLD AUTO: 0.91 K/UL
MONOCYTES NFR BLD AUTO: 12.3 %
NEUTROPHILS # BLD AUTO: 3.09 K/UL
NEUTROPHILS NFR BLD AUTO: 41.7 %
PLATELET # BLD AUTO: 435 K/UL
POTASSIUM SERPL-SCNC: 4.5 MMOL/L
PROT SERPL-MCNC: 6.7 G/DL
RBC # BLD: 4.23 M/UL
RBC # FLD: 15.2 %
SODIUM SERPL-SCNC: 140 MMOL/L
T3RU NFR SERPL: 1 TBI
T4 SERPL-MCNC: 8.3 UG/DL
TSH SERPL-ACNC: 1.31 UIU/ML
WBC # FLD AUTO: 7.4 K/UL

## 2020-12-04 NOTE — PHYSICAL EXAM
[Alert] : alert [Well Nourished] : well nourished [No Acute Distress] : no acute distress [Well Developed] : well developed [Normal Sclera/Conjunctiva] : normal sclera/conjunctiva [EOMI] : extra ocular movement intact [No Proptosis] : no proptosis [Thyroid Not Enlarged] : the thyroid was not enlarged [No Thyroid Nodules] : no palpable thyroid nodules [Clear to Auscultation] : lungs were clear to auscultation bilaterally [Normal S1, S2] : normal S1 and S2 [Normal Rate] : heart rate was normal [Regular Rhythm] : with a regular rhythm [No Edema] : no peripheral edema [Normal Bowel Sounds] : normal bowel sounds [Not Tender] : non-tender [Not Distended] : not distended [Soft] : abdomen soft [Normal Anterior Cervical Nodes] : no anterior cervical lymphadenopathy [No Spinal Tenderness] : no spinal tenderness [Spine Straight] : spine straight [No Stigmata of Cushings Syndrome] : no stigmata of Cushings Syndrome [Normal Gait] : normal gait [No Rash] : no rash [Acanthosis Nigricans] : no acanthosis nigricans [Normal Reflexes] : deep tendon reflexes were 2+ and symmetric [No Tremors] : no tremors [Oriented x3] : oriented to person, place, and time

## 2020-12-04 NOTE — ASSESSMENT
[Levothyroxine] : The patient was instructed to take Levothyroxine on an empty stomach, separate from vitamins, and wait at least 30 minutes before eating [FreeTextEntry1] : 66 yo female with hypothyroidism\par \par Pt is on  LT4 125 6d/wk. No sx of hypo or hyperthyroidism. No local neck pain. No dysphagia or dysphonia. No raciness, shakiness, heat/cold intolerance, tiredness, or fatigue.\par \par last A1c 6.0%.prior 6.2, 6.1.  Hemoglobin A1c shows prediabetes. Natural history of prediabetes discussed in detail. Pt advised re: watching weight, maintaining moderate to low carbohydrate intake. Controversy concerning use of metformin for prediabetes reviewed. \par \par Request labs from Dr Mcmullen\par \par f/u in 1 year

## 2020-12-04 NOTE — HISTORY OF PRESENT ILLNESS
[FreeTextEntry1] :  . \par \par Pt is on  LT4 125 6d/wk. No sx of hypo or hyperthyroidism. No local neck pain. No dysphagia or dysphonia. No raciness, shakiness, heat/cold intolerance, tiredness, or fatigue.\par \par Occasional palpitations, had EKG, echo normal. Had benign breast bx. Menopausal x 2 years. Mod hat flashes. No BMD\par  PreDM asympt \par Dx: hemochromatosis, began phlebotomy spring 2016, no recent phlebotomy. told of low blood iron but high liver iron level. sees Dr Collins. Hematology Dr. Rudolph\par \par Prediabetes, A1c 6.0%.prior 6.2, 6.1. Changed diet, lost about 20 lbs. No FHx diabetes. \par .

## 2020-12-28 NOTE — ASU PREOP CHECKLIST - AS BP NONINV SITE
promethazine (PHENERGAN) tablet 12.5 mg, 12.5 mg, Oral, Q6H PRN **OR** ondansetron (ZOFRAN) injection 4 mg, 4 mg, Intravenous, Q6H PRN  polyethylene glycol (GLYCOLAX) packet 17 g, 17 g, Oral, Daily PRN  acetaminophen (TYLENOL) tablet 650 mg, 650 mg, Oral, Q6H PRN **OR** acetaminophen (TYLENOL) suppository 650 mg, 650 mg, Rectal, Q6H PRN  LORazepam (ATIVAN) tablet 1 mg, 1 mg, Oral, Q1H PRN **OR** LORazepam (ATIVAN) injection 1 mg, 1 mg, Intravenous, Q1H PRN **OR** LORazepam (ATIVAN) tablet 2 mg, 2 mg, Oral, Q1H PRN **OR** LORazepam (ATIVAN) injection 2 mg, 2 mg, Intravenous, Q1H PRN **OR** LORazepam (ATIVAN) tablet 3 mg, 3 mg, Oral, Q1H PRN **OR** LORazepam (ATIVAN) injection 3 mg, 3 mg, Intravenous, Q1H PRN **OR** LORazepam (ATIVAN) tablet 4 mg, 4 mg, Oral, Q1H PRN **OR** LORazepam (ATIVAN) injection 4 mg, 4 mg, Intravenous, Q1H PRN  Physical    VITALS:  /69   Pulse 72   Temp 98.5 °F (36.9 °C) (Axillary)   Resp 17   Ht 5' 10\" (1.778 m)   Wt 208 lb 1.6 oz (94.4 kg)   SpO2 100%   BMI 29.86 kg/m²   CONSTITUTIONAL:  sedated  EYES:  Lids and lashes normal, pupils equal, round and reactive to light, extra ocular muscles intact, sclera clear, conjunctiva normal  ENT:  normocepalic, without obvious abnormality  NECK:  supple, symmetrical, trachea midline  HEMATOLOGIC/LYMPHATICS:  no cervical lymphadenopathy  LUNGS:  No increased work of breathing, good air exchange, clear to auscultation bilaterally, no crackles or wheezing  CARDIOVASCULAR:  normal apical pulses  ABDOMEN:  No scars, normal bowel sounds, soft, non-distended, non-tender, no masses palpated, no hepatosplenomegally  MUSCULOSKELETAL:  Bilateral lower extremity has areas with purplish color and bruises NEUROLOGIC:  Awake, alert, oriented to name, place and time. Cranial nerves II-XII are grossly intact. Motor is 5 out of 5 bilaterally. Cerebellar finger to nose, heel to shin intact. Sensory is intact. Babinski down going, Romberg negative, and gait is normal.  SKIN:  As noted above  Data    CBC:   Lab Results   Component Value Date    WBC 7.3 12/28/2020    RBC 2.18 12/28/2020    HGB 6.2 12/28/2020    HCT 18.0 12/28/2020    MCV 82.6 12/28/2020    MCH 28.4 12/28/2020    MCHC 34.4 12/28/2020    RDW 13.7 12/28/2020     12/28/2020    MPV 10.5 12/28/2020       ASSESSMENT AND PLAN      1. Severe  Hyponatremia: noted some improvement on NA since admission. Continue IV fluid replacement  Current etiology suspected to be SIADH, Medication induced and etoh abuse  Monitor Na as ordered  Check Cortisol level  Nephro and intensivist following. 2. Severe Anemia: hgb currently at 6.2. Will replace with 1 Unit of PRBC  Check BMP in am  GI following  On IV PPI  Will discuss additional source of nutrition if patient requires more days of sedation. 3. Hypotension: on Dopamine drip  BP much improved. Hypotension may be related to overall circulatory shock. Lactic acid on admission was 6.3. Will continue to monitor    4. CVA (cerebral vascular accident) ([address]  History of.  PT/OT when more awake or out of sedation    5. Diabetes mellitus ([address]  Well controlled  Due to lactic acidosis, Metformin has been held  Will monitor  Continue insulin sliding scale    6. Hx of etoh abuse: not on withdrawal. However, there is no way to tell since he is sedated. 30 minutes of Critical care time spent in taking care of patient. left upper arm

## 2021-02-16 ENCOUNTER — RX RENEWAL (OUTPATIENT)
Age: 66
End: 2021-02-16

## 2021-03-23 NOTE — H&P PST ADULT - NECK DETAILS
Pharmacy contacted, CEFPODOXINE 150MG is not covered by insurance  [facility] [phone] no JVD/normal/supple

## 2021-05-18 NOTE — ASU PATIENT PROFILE, ADULT - NS PRO PASSIVE SMOKE EXP
Nephrology Progress Note  5/18/2021 9:08 AM        Subjective:   Admit Date: 5/11/2021  PCP: Vincent Jones DO    Interval History: doing well - wants to go home     Diet: good    ROS:  Good UOP_ arreola out   Off BIPAP  UOP 2550/d    Data:     Current meds:    cefepime  1,000 mg Intravenous Q12H    ipratropium-albuterol  1 ampule Inhalation Q4H WA    atorvastatin  20 mg Oral Nightly    acetylcysteine  600 mg Inhalation BID    docusate sodium  100 mg Oral BID    amLODIPine  10 mg Oral Daily    aspirin EC  81 mg Oral QAM    azelastine  1 spray Each Nostril BID    carvedilol  25 mg Oral BID    vitamin D  2,000 Units Oral Daily    citalopram  10 mg Oral Daily    budesonide-formoterol  2 puff Inhalation BID    folic acid  1 mg Oral Daily    montelukast  10 mg Oral Nightly    sodium chloride flush  5-40 mL Intravenous 2 times per day    doxycycline hyclate  100 mg Oral 2 times per day    guaiFENesin  600 mg Oral BID    famotidine  20 mg Oral Daily    heparin (porcine)  5,000 Units Subcutaneous 3 times per day      sodium chloride           I/O last 3 completed shifts:  In: -   Out: 2550 [Urine:2550]    CBC:   Recent Labs     05/16/21  0429 05/17/21  0559 05/18/21  0545   WBC 7.3 7.7 6.8   HGB 12.6* 12.6* 12.4*    179 199          Recent Labs     05/16/21  0429 05/17/21  0559 05/18/21  0545    136 135   K 3.9 4.0 4.0   CL 95* 93* 96*   CO2 25 26 23   BUN 86* 81* 81*   CREATININE 6.4* 6.5* 6.4*   GLUCOSE 109* 92 100*       Lab Results   Component Value Date    CALCIUM 8.8 05/18/2021    PHOS 6.8 (H) 05/18/2021       Objective:     Vitals: /71   Pulse 76   Temp 98 °F (36.7 °C) (Oral)   Resp 15   Ht 5' 8\" (1.727 m)   Wt 232 lb 9.6 oz (105.5 kg)   SpO2 93%   BMI 35.37 kg/m²     General appearance:  No distress  wants to go home  HEENT:  Mild conj pallor  Neck:  supple  Lungs:  + adv Bs  Heart:  RRR  Abdomen: soft  Extremities:  Lt BKA   No overt edema on RLE  arreola out       Problem List :         Impression :     1. DAX- CKD stage 4 a3-  Cr plateau - as expected may had ATI= has atherosclerotic renal dz   2. ADHF compensated off all diureitcs now   3. ACOPD- ? Lung infection etc   4. Severe and diffuse  ASCVD  5. BP acceptable     Recommendation/Plan  :     1.  may d/cancer from my standpoint   2. Low salt  3. DASH diet  4. No diureitcs for now   5. Daily wt  6. Call me with wt gain > 2 lbs   7. Low salt  8. CMP in 5-7 days  9. F/U with me in 10-12 days   10.  Call me with any sx - sob/ edema etc       Lizette Moody MD MD No

## 2021-06-28 NOTE — H&P PST ADULT - NSANTHGENDERRD_ENT_A_CORE
Ote[facility] Pregnancy And Lactation Text: This medication is Pregnancy Category C and it isn't known if it is safe during pregnancy. It is unknown if it is excreted in breast milk. No

## 2021-09-20 ENCOUNTER — APPOINTMENT (OUTPATIENT)
Dept: SURGICAL ONCOLOGY | Facility: CLINIC | Age: 66
End: 2021-09-20
Payer: MEDICARE

## 2021-09-29 ENCOUNTER — APPOINTMENT (OUTPATIENT)
Dept: SURGICAL ONCOLOGY | Facility: CLINIC | Age: 66
End: 2021-09-29
Payer: MEDICARE

## 2021-09-29 VITALS
RESPIRATION RATE: 16 BRPM | HEART RATE: 76 BPM | HEIGHT: 65.5 IN | OXYGEN SATURATION: 96 % | SYSTOLIC BLOOD PRESSURE: 123 MMHG | DIASTOLIC BLOOD PRESSURE: 69 MMHG | BODY MASS INDEX: 21.89 KG/M2 | WEIGHT: 133 LBS

## 2021-09-29 PROCEDURE — 99214 OFFICE O/P EST MOD 30 MIN: CPT

## 2021-09-29 NOTE — PHYSICAL EXAM
[Normal] : supple, no neck mass and thyroid not enlarged [Normal Supraclavicular Lymph Nodes] : normal supraclavicular lymph nodes [Normal Axillary Lymph Nodes] : normal axillary lymph nodes [Normal] : oriented to person, place and time, with appropriate affect [FreeTextEntry1] : \par PANCHO was present for the physical exam.  [de-identified] : Normal S1,S2. Regular rate and rhythm [de-identified] : Complete bilateral breast examination performed supine and upright.  Nodular but no palpable masses, tenderness ,nipple discharge, inversion ,deviation or enlarged axillary or supraclavicular lymph node. [de-identified] : Clear breath sounds bilaterally, normal respiratory effort\par \par

## 2021-09-29 NOTE — ASSESSMENT
[FreeTextEntry1] : Impression:\par History significant for bilateral breast cysts and bilateral benign biopsies.  Mother with history of postmenopausal breast cancer. \par B/L mammo2/11/21: No evidence of malignancy BIRADS 2 \par B/L sono 2/11/21: Left breast new probable complicated cyst; 6 months f/u for confirmation. BIRADS 3 \par Pt. states f/u left breast US @ [facility] 9/2021 was stable - will obtain report. \par \par \par Plan: \par Annual MMG/US 2/2022\par RTO yearly \par \par \par \par

## 2021-09-29 NOTE — HISTORY OF PRESENT ILLNESS
[de-identified] : Lindsay Amador 67 y/o female presenting for a yearly breast exam. \par \par History significant for bilateral breast cysts and bilateral benign biopsies.  Mother with history of postmenopausal breast cancer. \par \par B/L mammo2/11/21: No evidence of malignancy BIRADS 2 \par B/L sono 2/11/21: Left breast new probable complicated cyst; 6 months f/u for confirmation. BIRADS 3 \par \par Pt. states she had a f/u left breast US in 9/2021 @ [facility] and was told it was stable. Will obtain report. \par \par Denies palpable breast masses, nipple discharge, skin changes, inversion of breast pain. Denies constitutional symptoms.  RETIRED.

## 2021-11-01 NOTE — ASU PATIENT PROFILE, ADULT - CAREGIVER
Speech Therapy - IRP  Communication/Cognition Evaluation and Treatment     RECOMMENDATIONS:   Patient continues to demonstrate acute communication and cognition deficits.  Speech to continue to follow to address these deficits.  Patient will require 24/7 supervision at discharge.    Recommendations for Discharge: SLP: ongoing ST at next level of care      Recommendations discussed with physician and RN who was informed of results of session     ASSESSMENT:   The patient was seen for communication and cognition evaluation.     The patient presents with mild impairments in language, moderate impairements in memory and severe impairments in visuospacial skills executive function which impact safe and efficient communication/cognition and expression of ideas/needs. The patient is currently needing moderate cueing for communication/cognition and expression of ideas/needs.    Pt reports baseline language and cog-comm deficits but that she is below baseline. Also w/ Hemianopsia - right.       Further skilled speech therapy services are reasonable and necessary to address the above impairments and performance deficits     This patient participated in all scheduled speech therapy time with this therapist today.    Prior Communication/Cognition:  Prior Cognition: Intact  Prior Auditory Comprehension: Intact  Prior Verbal Expression: Intact  Prior Reading: Intact  Prior Writing: Intact  Prior Memory: Intact  Prior Money Management: Intact  Additional Comments: Discharged from SLP services for dysphagia on 10/28, tolerating general/thins diet      Baseline Diet:  Feeds Self: Yes  Liquids: Thin  Solids : General  Additional Comments: Discharged from SLP services for dysphagia on 10/28, tolerating general/thins diet    Education:     Education Provided  On this date, the patient was educated on POC and role of SLP.    The response to education was: Verbalizes understanding      Plan for Next Session:  Plan for Next Session:  finish cog/comm evaluation    Frequency:  Frequency: 5x/week, 45 minutes    Barriers to Discharge:   SLP Identified Barriers to Discharge: language impairment    Recommendations for Discharge:  Recommendations for Discharge: SLP IL: Patient is appropriate for intensive Speech Therapy with the oversight of a physical medicine and rehabilitation physician;Patient requires 24-hour assistance secondary to cognitive/communication impairments (10/31/21 1130)    Subjective/Objective:  Communication/Cognition:        Cognitive Linguistic Quick Test (CLQT)   The CLQT is a standardized, criterion-referenced assessment that is designed to gain information about cognitive-linguistic functioning for acquired neurological dysfunction in individuals ages 18-89.  The CLQT assists in determining the relative status of five primary cognitive domains important to basic and higher-level ADL's: attention, executive functions, memory, language, and visuospatial skills.  Cognitive Domain Score Severity Rating   Attention 47/215 Severe   Memory 113/185 Moderate   Executive Functions 9/40 Severe   Language 25/37 Mild   Visuospatial Skills 25/105 Severe   Composite Severity Rating  Moderate   Clock Drawing 10/13 Mild   WNL=within normal limits       GOALS:  SLP Goals  Short Term Goals to be Reviewed on : 11/07/21  STG are the Same as Discharge Goals: Yes  Goal Agreement: Patient agrees with goals and treatment plan  Problem Solving Short Term Goal 1  Problem Solving STG 1: Pt will complete mild-moderately complex functional problem solving tasks (including sequencing) with 80% accuracy and min cues.  Additional Language Short Term Goal 1  Additional Language STG 1: Pt will complete expressive language tasks utilizing SFA with 80% accuracy and min cues.    Total Treatment Time:   45 min     At the end of the therapy session, patient is in wheelchair with the following safety measures in place: alarm system in place/re-engaged. Patient is  located in the transport line and was handed off to Rehab Aide/Tech. Patient's family was not present. .     No

## 2021-11-04 ENCOUNTER — APPOINTMENT (OUTPATIENT)
Dept: GASTROENTEROLOGY | Facility: CLINIC | Age: 66
End: 2021-11-04

## 2022-01-06 ENCOUNTER — APPOINTMENT (OUTPATIENT)
Dept: ENDOCRINOLOGY | Facility: CLINIC | Age: 67
End: 2022-01-06
Payer: MEDICARE

## 2022-01-06 VITALS
WEIGHT: 135 LBS | OXYGEN SATURATION: 99 % | SYSTOLIC BLOOD PRESSURE: 120 MMHG | HEIGHT: 65.5 IN | HEART RATE: 67 BPM | BODY MASS INDEX: 22.22 KG/M2 | DIASTOLIC BLOOD PRESSURE: 80 MMHG | TEMPERATURE: 97.6 F

## 2022-01-06 PROCEDURE — 99213 OFFICE O/P EST LOW 20 MIN: CPT

## 2022-01-07 NOTE — ASSESSMENT
[Levothyroxine] : The patient was instructed to take Levothyroxine on an empty stomach, separate from vitamins, and wait at least 30 minutes before eating [FreeTextEntry1] : 67 yo female with hypothyroidism\par \par Pt is on LT4 125 mcg 6 days/week. No sx of hypo or hyperthyroidism. No local neck pain. No dysphagia or dysphonia. No raciness, shakiness, heat/cold intolerance, tiredness, or fatigue. No palpitations, tremors, or sudden weight gain/loss.\par \par H/o prediabetes. Natural history of prediabetes discussed in detail. Pt advised re: watching weight, maintaining moderate to low carbohydrate intake. Controversy concerning use of metformin for prediabetes reviewed.\par \par Call Dr. Ike Mcmullen for labs.\par \par Request labs sent out. Will repeat TFT.\par \par F/u in 1 year

## 2022-01-07 NOTE — HISTORY OF PRESENT ILLNESS
[FreeTextEntry1] : No significant interval health changes. No interval surgery, hospitalizations, fractures, or change in medications.\par \par Pt is on LT4 125 mcg 6 days/week. No sx of hypo or hyperthyroidism. No local neck pain. No dysphagia or dysphonia. No raciness, shakiness, heat/cold intolerance, tiredness, or fatigue. No palpitations, tremors, or sudden weight gain/loss.\par \par Occasional palpitations, had EKG, echo normal. Had benign breast bx. Menopausal x 2 years. No BMD.\par \par Dx: hemochromatosis, began phlebotomy spring 2016, no recent phlebotomy. Sees Dr. Jasper Collins. Hematology Dr. Rudolph.\par \par H/o stable prediabetes, A1c 6.0%.prior 6.2, 6.1. Changed diet, previously lost about 20 lbs. No FHx diabetes.

## 2022-01-07 NOTE — END OF VISIT
[FreeTextEntry3] : I, Armando Martinez, authored this note working as a medical scribe for Dr. Arenas.  01/06/2022.  3:00PM.  This note was authored by the medical scribe for me. I have reviewed, edited, and revised the note as needed. I am in agreement with the exam findings, imaging findings, and treatment plan.  Oliver Arenas MD

## 2022-09-19 NOTE — REASON FOR VISIT
[Follow-Up Visit] : a follow-up visit for [Breast Cyst] : breast cyst [Fibrocystic Breast] : fibrocystic breast

## 2022-09-21 ENCOUNTER — APPOINTMENT (OUTPATIENT)
Dept: SURGICAL ONCOLOGY | Facility: CLINIC | Age: 67
End: 2022-09-21

## 2022-09-21 VITALS
OXYGEN SATURATION: 98 % | SYSTOLIC BLOOD PRESSURE: 108 MMHG | WEIGHT: 135 LBS | TEMPERATURE: 97.7 F | DIASTOLIC BLOOD PRESSURE: 67 MMHG | HEART RATE: 68 BPM | BODY MASS INDEX: 22.22 KG/M2 | RESPIRATION RATE: 16 BRPM | HEIGHT: 65.5 IN

## 2022-09-21 PROCEDURE — 99214 OFFICE O/P EST MOD 30 MIN: CPT

## 2022-09-21 NOTE — PHYSICAL EXAM
[Normal] : supple, no neck mass and thyroid not enlarged [Normal Supraclavicular Lymph Nodes] : normal supraclavicular lymph nodes [Normal Axillary Lymph Nodes] : normal axillary lymph nodes [Normal] : oriented to person, place and time, with appropriate affect [FreeTextEntry1] : SC present for exam  [de-identified] : Normal S1,S2. Regular rate and rhythm [de-identified] : Complete bilateral breast examination performed supine and upright.  Nodular but no palpable masses, tenderness ,nipple discharge, inversion ,deviation or enlarged axillary or supraclavicular lymph node. [de-identified] : Clear breath sounds bilaterally, normal respiratory effort\par \par

## 2022-09-21 NOTE — ASSESSMENT
[FreeTextEntry1] : Impression:\par History significant for bilateral breast cysts and bilateral benign biopsies.  Mother with history of postmenopausal breast cancer. \par B/L mammo/us 3/31/22: No evidence of malignancy BIRADS 2 \par B/L sono 2/11/21: b/l fluctuating cysts and cystic clusters. No suspicious findings. Multiple b/l cysts\par \par Plan: \par Annual MMG/US 3/2023- ordered\par RTO yearly \par \par \par \par All medical entries were at my, Dr. Abraham Sanderson, direction. I have reviewed the chart and agree that the record accurately reflects my personal performance of the history, physical exam, assessment and plan. Our office Nurse Practitioner was present of the duration of the office visit.

## 2022-09-21 NOTE — HISTORY OF PRESENT ILLNESS
[de-identified] : Lindsay Amador 65 y/o female presenting for a yearly breast exam. \par \par History significant for bilateral breast cysts and bilateral benign biopsies.  Mother with history of postmenopausal breast cancer. \par \par B/L mammo/US 3/31/22: No mammographic evidence of malignancy. B/L Fluctuating cysts and cystic clusters. Multiple b/l cysts. BIRADS 2 \par \par Denies palpable breast masses, nipple discharge, skin changes, inversion of breast pain. Denies constitutional symptoms.  RETIRED.

## 2023-01-18 ENCOUNTER — LABORATORY RESULT (OUTPATIENT)
Age: 68
End: 2023-01-18

## 2023-01-18 ENCOUNTER — APPOINTMENT (OUTPATIENT)
Dept: ENDOCRINOLOGY | Facility: CLINIC | Age: 68
End: 2023-01-18
Payer: MEDICARE

## 2023-01-18 VITALS
DIASTOLIC BLOOD PRESSURE: 68 MMHG | HEART RATE: 88 BPM | WEIGHT: 135 LBS | HEIGHT: 64.5 IN | RESPIRATION RATE: 14 BRPM | OXYGEN SATURATION: 96 % | BODY MASS INDEX: 22.77 KG/M2 | SYSTOLIC BLOOD PRESSURE: 110 MMHG

## 2023-01-18 PROCEDURE — 99214 OFFICE O/P EST MOD 30 MIN: CPT

## 2023-01-19 LAB
ALBUMIN SERPL ELPH-MCNC: 4.4 G/DL
ALP BLD-CCNC: 91 U/L
ALT SERPL-CCNC: 34 U/L
ANION GAP SERPL CALC-SCNC: 9 MMOL/L
AST SERPL-CCNC: 47 U/L
BILIRUB SERPL-MCNC: 0.4 MG/DL
BUN SERPL-MCNC: 12 MG/DL
CALCIUM SERPL-MCNC: 10.2 MG/DL
CHLORIDE SERPL-SCNC: 99 MMOL/L
CO2 SERPL-SCNC: 32 MMOL/L
CREAT SERPL-MCNC: 0.75 MG/DL
EGFR: 87 ML/MIN/1.73M2
ESTIMATED AVERAGE GLUCOSE: 143 MG/DL
GLUCOSE SERPL-MCNC: 125 MG/DL
HBA1C MFR BLD HPLC: 6.6 %
POTASSIUM SERPL-SCNC: 4 MMOL/L
PROT SERPL-MCNC: 7.4 G/DL
SODIUM SERPL-SCNC: 140 MMOL/L
T3RU NFR SERPL: 0.9 TBI
T4 SERPL-MCNC: 9.8 UG/DL
TSH SERPL-ACNC: 1.38 UIU/ML

## 2023-01-19 NOTE — HISTORY OF PRESENT ILLNESS
[FreeTextEntry1] : Patient returns for a follow up visit for hypothyroidism. Since the last visit pt has no significant interval health changes. No interval surgery, hospitalizations, fractures, or change in medications.\par \par Pt is on LT4 125 mcg 6 days/week. No sx of hypo or hyperthyroidism. No local neck pain. No dysphagia or dysphonia. No raciness, shakiness, heat/cold intolerance, tiredness, or fatigue. No palpitations, tremors, or sudden weight gain/loss.\par \par Occasional palpitations, had EKG, echo normal. Had benign breast bx. Menopausal x 2 years. No BMD.\par \par Dx: hemochromatosis, began phlebotomy spring 2016, no recent phlebotomy. Sees Dr. Jasper Collins. Hematology Dr. Rudolph.  Had MRI of liver to assess iron content.  Details not available.\par \par H/o stable prediabetes, A1c 6.0%.prior 6.2, 6.1. Changed diet, previously lost about 20 lbs. No FHx diabetes.

## 2023-01-19 NOTE — END OF VISIT
[FreeTextEntry3] : This note was written by Bindu Cannon on ( January 18, 2023 ) acting as a medical scribe for Dr. Arenas  This note was authored by the medical scribe for me. I have reviewed, edited, and revised the note as needed. I am in agreement with the exam findings, imaging findings, and treatment plan.  Oliver Arenas MD

## 2023-01-19 NOTE — ASSESSMENT
[Levothyroxine] : The patient was instructed to take Levothyroxine on an empty stomach, separate from vitamins, and wait at least 30 minutes before eating [FreeTextEntry1] : 68 yo female with hypothyroidism\par \par Pt is on LT4 125 mcg 6 days/week. No sx of hypo or hyperthyroidism. No local neck pain. No dysphagia or dysphonia. No raciness, shakiness, heat/cold intolerance, tiredness, or fatigue. No palpitations, tremors, or sudden weight gain/loss.\par \par H/o prediabetes. Natural history of prediabetes discussed in detail. Pt advised re: watching weight, maintaining moderate to low carbohydrate intake. Controversy concerning use of metformin for prediabetes reviewed.\par \par Call Dr. Ike Mcmullen for labs.\par \par Request labs sent out. Will repeat TFT.\par \par F/u in 1 year

## 2023-03-03 ENCOUNTER — TRANSCRIPTION ENCOUNTER (OUTPATIENT)
Age: 68
End: 2023-03-03

## 2023-03-03 ENCOUNTER — APPOINTMENT (OUTPATIENT)
Dept: ENDOCRINOLOGY | Facility: CLINIC | Age: 68
End: 2023-03-03
Payer: MEDICARE

## 2023-03-03 VITALS — BODY MASS INDEX: 21.08 KG/M2 | HEIGHT: 64.5 IN | WEIGHT: 125 LBS

## 2023-03-03 PROCEDURE — G0108 DIAB MANAGE TRN  PER INDIV: CPT

## 2023-03-09 ENCOUNTER — RX RENEWAL (OUTPATIENT)
Age: 68
End: 2023-03-09

## 2023-03-31 NOTE — H&P PST ADULT - PROBLEM SELECTOR PLAN 3
amLODIPine 5 mg oral tablet: 1 orally once a day  aspirin 81 mg oral tablet: 1 tab(s) orally once a day  omega-3 polyunsaturated fatty acids 1000 mg oral capsule: 2 tab(s) orally 2 times a day  sacubitril-valsartan 24 mg-26 mg oral tablet: 1 tab(s) orally 2 times a day  ticagrelor 90 mg oral tablet: 1 tab(s) orally every 12 hours   aspirin 81 mg oral tablet: 1 tab(s) orally once a day  carvedilol 3.125 mg oral tablet: 1 tab(s) orally every 12 hours  fenofibrate 48 mg oral tablet: 1 tab(s) orally once a day  omega-3 polyunsaturated fatty acids 1000 mg oral capsule: 2 tab(s) orally 2 times a day  rosuvastatin 40 mg oral tablet: 1 tab(s) orally once a day (at bedtime)  sacubitril-valsartan 24 mg-26 mg oral tablet: 1 tab(s) orally 2 times a day  ticagrelor 90 mg oral tablet: 1 tab(s) orally every 12 hours   Last heme/onc consult note requested. Office to fax

## 2023-04-24 NOTE — ASU PREOP CHECKLIST - WARM FLUIDS/WARM BLANKETS
"    [facility] - PODIATRY    Today's Date: 05/02/2023     Patient Name: Cathi Krishnan  MRN: [id number]  CSN: [id number]  PCP: Merrick Knihgt MD  Referring Provider: No ref. provider found    SUBJECTIVE     Chief Complaint   Patient presents with   • Follow-up     Merrick Knight  2 WK  FU RECHECK-pt states she is here today for a recheck on her luis nail removal-pt reports very mild pain     HPI: Cathi Krishnan, a 42 y.o.female, comes to clinic as a(n) established patient for follow-up treatment of nail avulsions. Patient has h/o anxiety, arthritis, depression, GERD, RA, Varicose veins. Had bilateral hallux TNA with matrixectomy 2 weeks ago. She has been caring for them as instructed and notes improvement. Admits pain at 1/10 level and described as aching, throbbing and dull. Relates previous treatment(s) including slant back. Denies any constitutional symptoms. No other pedal complaints at this time.    Past Medical History:   Diagnosis Date   • Ankle swelling    • Anxiety    • Arthritis    • Back pain    • Callus 2014   • Constipation    • Depression    • Excessive thirst    • GERD (gastroesophageal reflux disease)    • Heartburn    • History of attempted suicide     age 13 years old    • Ingrown toenail 2020   • Leg cramps     with walking   • Loss of bladder control leaking urine   • Pain     Shoulder, neck, knee,back     • Rash     in skin folds    • Rheumatoid arthritis     \"starting\" was mild case per Dr. Pelaez   • Varicose veins of both lower extremities    • Wears glasses      Past Surgical History:   Procedure Laterality Date   • CHOLECYSTECTOMY  1999    lap   • ENDOSCOPY     • ENDOSCOPY N/A 07/31/2018    Procedure: ESOPHAGOGASTRODUODENOSCOPY WITH ANESTHESIA;  Surgeon: Julio C Rodriguez MD;  Location: [facility] ENDOSCOPY;  Service: General   • ESSURE TUBAL LIGATION      2016 & 2017    • GASTRIC SLEEVE LAPAROSCOPIC N/A 11/28/2018    Procedure: GASTRIC SLEEVE LAPAROSCOPIC;  " "Surgeon: Julio C Rodriguez MD;  Location: [facility] OR;  Service: Bariatric   • HERNIA REPAIR Left     inguinal; without mesh    • TEETH EXTRACTION     • TOENAIL EXCISION  2023   • TONSILLECTOMY     • TOTAL KNEE ARTHROPLASTY Right 2022     Family History   Problem Relation Age of Onset   • Hypertension Father    • Breast cancer Mother    • Ovarian cancer Mother    • Hypertension Mother    • Diabetes Mother         Pass away in    • Stroke Mother    • Coronary artery disease Mother    • Deep vein thrombosis Mother    • Obesity Mother    • Heart disease Mother    • Sleep apnea Mother    • Lung cancer Paternal Grandfather    • Hypertension Brother    • Obesity Brother    • Diabetes Brother         Pass away    • Heart attack Brother    • Heart disease Brother    • Heart attack Maternal Grandmother    • Obesity Maternal Grandmother    • Obesity Brother      Social History     Socioeconomic History   • Marital status:    Tobacco Use   • Smoking status: Former     Packs/day: 1.00     Years: 10.00     Pack years: 10.00     Types: Cigarettes     Quit date: 2010     Years since quittin.3     Passive exposure: Past   • Smokeless tobacco: Never   Vaping Use   • Vaping Use: Never used   Substance and Sexual Activity   • Alcohol use: No   • Drug use: No   • Sexual activity: Yes     Partners: Male     Birth control/protection: Pill, Essure, Birth control pill     Allergies   Allergen Reactions   • Asa [Aspirin] Shortness Of Breath and Swelling   • Cortisone Shortness Of Breath and Swelling   • Penicillins Hives and Shortness Of Breath     \"All Cillins\"   • Prednisone Shortness Of Breath and Swelling   • Zantac [Ranitidine Hcl] Shortness Of Breath and Swelling   • Ibuprofen Hives   • Nsaids Unknown (See Comments)     Bariatric Surgery     Current Outpatient Medications   Medication Sig Dispense Refill   • busPIRone (BUSPAR) 15 MG tablet Take 1 tablet by mouth 3 (Three) Times a Day.     • " cyclobenzaprine (FLEXERIL) 10 MG tablet      • docusate sodium (COLACE) 100 MG capsule Take 90 mg by mouth Daily.     • DULoxetine (CYMBALTA) 60 MG capsule Take 1 capsule by mouth 2 (Two) Times a Day.     • ferrous sulfate 325 (65 FE) MG tablet Take 1 tablet by mouth Daily With Breakfast.     • HYDROcodone-acetaminophen (NORCO) 5-325 MG per tablet Take 1 tablet by mouth Every 6 (Six) Hours As Needed.     • Multiple Vitamins-Minerals (MULTIVITAMIN ADULT PO) Take  by mouth.     • pantoprazole (PROTONIX) 40 MG EC tablet Take 1 tablet by mouth Daily. 30 tablet 1   • propranolol (INDERAL) 20 MG tablet Take 1 tablet by mouth 3 (Three) Times a Day As Needed (panic and anxiety).     • pseudoephedrine (SUDAFED) 30 MG tablet Take 1 tablet by mouth Daily As Needed for Congestion.     • BIOTIN PO Take  by mouth.     • calcium citrate-vitamin d (CALCITRATE) 315-250 MG-UNIT tablet tablet Take  by mouth Daily.     • Cyanocobalamin (VITAMIN B 12 PO) Take  by mouth.     • simethicone (GAS-X) 80 MG chewable tablet Chew 0.5 tablets Every 6 (Six) Hours As Needed for Flatulence (belching). 30 tablet 1   • vitamin D (ERGOCALCIFEROL) 36558 UNITS capsule capsule Take 1 capsule by mouth Every 7 (Seven) Days. Tuesday       No current facility-administered medications for this visit.     Review of Systems   Constitutional: Negative for chills and fever.   HENT: Negative for congestion.    Respiratory: Negative for shortness of breath.    Cardiovascular: Negative for chest pain and leg swelling.   Gastrointestinal: Negative for constipation, diarrhea, nausea and vomiting.   Musculoskeletal: Positive for arthralgias.        Foot pain   Skin: Negative for wound.   Neurological: Negative for numbness.       OBJECTIVE     Vitals:    05/02/23 0812   BP: 130/86   Pulse: 75   SpO2: 98%       PHYSICAL EXAM  GEN:   Accompanied by s/o.     Foot/Ankle Exam    GENERAL  Appearance:  appears stated age and obese  Orientation:  AAOx3  Affect:   appropriate  Gait:  unimpaired  Assistance:  independent  Right shoe gear: casual shoe  Left shoe gear: casual shoe    VASCULAR     Right Foot Vascularity   Dorsalis pedis:  2+  Posterior tibial:  2+  Skin temperature:  warm  Edema grading:  None  CFT:  3  Pedal hair growth:  Present  Varicosities:  none     Left Foot Vascularity   Dorsalis pedis:  2+  Posterior tibial:  2+  Skin temperature:  warm  Edema grading:  None  CFT:  3  Pedal hair growth:  Present  Varicosities:  none     NEUROLOGIC     Right Foot Neurologic   Normal sensation    Light touch sensation: normal  Vibratory sensation: normal  Hot/Cold sensation: normal  Protective Sensation using Jenks-Nereida Monofilament:   Sites intact: 10  Sites tested: 10     Left Foot Neurologic   Normal sensation    Light touch sensation: normal  Vibratory sensation: normal  Hot/Cold sensation:  normal  Protective Sensation using Jenks-Nereida Monofilament:   Sites intact: 10  Sites tested: 10    MUSCULOSKELETAL     Right Foot Musculoskeletal   Ecchymosis:  none  Tenderness:  none    Arch:  Normal  Mallet toe:  Second toe     Left Foot Musculoskeletal   Ecchymosis:  none  Tenderness:  none  Arch:  Normal    MUSCLE STRENGTH     Right Foot Muscle Strength   Foot dorsiflexion:  5  Foot plantar flexion:  5  Foot inversion:  5  Foot eversion:  5     Left Foot Muscle Strength   Foot dorsiflexion:  5  Foot plantar flexion:  5  Foot inversion:  5  Foot eversion:  5    RANGE OF MOTION     Right Foot Range of Motion   Foot and ankle ROM within normal limits       Left Foot Range of Motion   Foot and ankle ROM within normal limits      DERMATOLOGIC      Right Foot Dermatologic   Skin  Right foot skin is intact.   Nails  1.  Absent. (Avulsion site healing as expected)  2.  Normal.  3.  Normal.  4.  Normal.  5.  Normal.     Left Foot Dermatologic   Skin  Left foot skin is intact.   Nails  1.  Absent. (Avulsion site healing as expected)  2.  Normal.  3.  Normal.  4.   Normal.  5.  Normal.      RADIOLOGY/NUCLEAR:  No results found.    LABORATORY/CULTURE RESULTS:      PATHOLOGY RESULTS:       ASSESSMENT/PLAN     Diagnoses and all orders for this visit:    1. Ingrown toenail (Primary)    2. Foot pain, bilateral      Comprehensive lower extremity examination and evaluation was performed.  Discussed findings and treatment plan including risks, benefits, and treatment options with patient in detail. Patient agreed with treatment plan.  Avulsion sites healing as expected. Continue post-procedure instructions until fully healed.    An After Visit Summary was printed and given to the patient at discharge, including (if requested) any available informative/educational handouts regarding diagnosis, treatment, or medications. All questions were answered to patient/family satisfaction. Should symptoms fail to improve or worsen they agree to call or return to clinic or to go to the Emergency Department. Discussed the importance of following up with any needed screening tests/labs/specialist appointments and any requested follow-up recommended by me today. Importance of maintaining follow-up discussed and patient accepts that missed appointments can delay diagnosis and potentially lead to worsening of conditions.  Return if symptoms worsen or fail to improve., or sooner if acute issues arise.    Procedures      Lab Frequency Next Occurrence   Follow Anesthesia Guidelines / Standing Orders Once 06/21/2018   Follow Anesthesia Guidelines / Standing Orders Once 10/29/2018   Obtain Informed Consent Once 11/03/2018       This document has been electronically signed by Ga Miguel DPM on May 2, 2023 08:22 CDT        no

## 2023-09-20 PROBLEM — N60.01 BILATERAL BREAST CYSTS: Status: ACTIVE | Noted: 2022-09-19

## 2023-09-27 ENCOUNTER — APPOINTMENT (OUTPATIENT)
Dept: SURGICAL ONCOLOGY | Facility: CLINIC | Age: 68
End: 2023-09-27
Payer: MEDICARE

## 2023-09-27 DIAGNOSIS — N60.01 SOLITARY CYST OF RIGHT BREAST: ICD-10-CM

## 2023-09-27 DIAGNOSIS — N60.02 SOLITARY CYST OF LEFT BREAST: ICD-10-CM

## 2023-09-27 DIAGNOSIS — N60.02 SOLITARY CYST OF RIGHT BREAST: ICD-10-CM

## 2023-10-25 ENCOUNTER — APPOINTMENT (OUTPATIENT)
Dept: SURGICAL ONCOLOGY | Facility: CLINIC | Age: 68
End: 2023-10-25
Payer: MEDICARE

## 2023-10-25 VITALS
HEIGHT: 64.5 IN | SYSTOLIC BLOOD PRESSURE: 140 MMHG | HEART RATE: 70 BPM | OXYGEN SATURATION: 98 % | BODY MASS INDEX: 21.08 KG/M2 | WEIGHT: 125 LBS | DIASTOLIC BLOOD PRESSURE: 80 MMHG

## 2023-10-25 DIAGNOSIS — N63.0 UNSPECIFIED LUMP IN UNSPECIFIED BREAST: ICD-10-CM

## 2023-10-25 PROCEDURE — 99213 OFFICE O/P EST LOW 20 MIN: CPT

## 2023-12-07 ENCOUNTER — APPOINTMENT (OUTPATIENT)
Dept: NEUROLOGY | Facility: CLINIC | Age: 68
End: 2023-12-07
Payer: MEDICARE

## 2023-12-07 VITALS
SYSTOLIC BLOOD PRESSURE: 138 MMHG | HEART RATE: 66 BPM | DIASTOLIC BLOOD PRESSURE: 71 MMHG | BODY MASS INDEX: 20.74 KG/M2 | WEIGHT: 123 LBS | HEIGHT: 64.5 IN

## 2023-12-07 DIAGNOSIS — F40.9 PHOBIC ANXIETY DISORDER, UNSPECIFIED: ICD-10-CM

## 2023-12-07 DIAGNOSIS — G31.84 MILD COGNITIVE IMPAIRMENT, SO STATED: ICD-10-CM

## 2023-12-07 PROCEDURE — 99204 OFFICE O/P NEW MOD 45 MIN: CPT

## 2023-12-08 ENCOUNTER — LABORATORY RESULT (OUTPATIENT)
Age: 68
End: 2023-12-08

## 2023-12-08 ENCOUNTER — NON-APPOINTMENT (OUTPATIENT)
Age: 68
End: 2023-12-08

## 2023-12-08 DIAGNOSIS — M89.9 DISORDER OF BONE, UNSPECIFIED: ICD-10-CM

## 2023-12-08 LAB
25(OH)D3 SERPL-MCNC: 69.2 NG/ML
ALBUMIN SERPL ELPH-MCNC: 4.3 G/DL
ALP BLD-CCNC: 104 U/L
ALT SERPL-CCNC: 24 U/L
ANION GAP SERPL CALC-SCNC: 11 MMOL/L
AST SERPL-CCNC: 34 U/L
BILIRUB SERPL-MCNC: 0.6 MG/DL
BUN SERPL-MCNC: 15 MG/DL
CALCIUM SERPL-MCNC: 10 MG/DL
CHLORIDE SERPL-SCNC: 100 MMOL/L
CHOLEST SERPL-MCNC: 253 MG/DL
CO2 SERPL-SCNC: 31 MMOL/L
CREAT SERPL-MCNC: 0.73 MG/DL
CRP SERPL-MCNC: <3 MG/L
EGFR: 90 ML/MIN/1.73M2
FOLATE SERPL-MCNC: >20 NG/ML
GLUCOSE SERPL-MCNC: 129 MG/DL
HCYS SERPL-MCNC: 9.1 UMOL/L
HDLC SERPL-MCNC: 72 MG/DL
LDLC SERPL CALC-MCNC: 171 MG/DL
NONHDLC SERPL-MCNC: 181 MG/DL
POTASSIUM SERPL-SCNC: 4.5 MMOL/L
PROT SERPL-MCNC: 7.2 G/DL
SODIUM SERPL-SCNC: 142 MMOL/L
T3FREE SERPL-MCNC: 2.37 PG/ML
T4 FREE SERPL-MCNC: 2.1 NG/DL
TRIGL SERPL-MCNC: 62 MG/DL
TSH SERPL-ACNC: 0.4 UIU/ML
VIT B12 SERPL-MCNC: 1195 PG/ML

## 2023-12-09 LAB
ERYTHROCYTE [SEDIMENTATION RATE] IN BLOOD BY WESTERGREN METHOD: 5 MM/HR
T PALLIDUM AB SER QL IA: NEGATIVE

## 2023-12-11 LAB
ALBUMIN MFR SERPL ELPH: 56.7 %
ALBUMIN SERPL-MCNC: 4.1 G/DL
ALBUMIN/GLOB SERPL: 1.3 RATIO
ALPHA1 GLOB MFR SERPL ELPH: 3.9 %
ALPHA1 GLOB SERPL ELPH-MCNC: 0.3 G/DL
ALPHA2 GLOB MFR SERPL ELPH: 9.5 %
ALPHA2 GLOB SERPL ELPH-MCNC: 0.7 G/DL
ANACR T: NEGATIVE
B-GLOBULIN MFR SERPL ELPH: 14 %
B-GLOBULIN SERPL ELPH-MCNC: 1 G/DL
DEPRECATED KAPPA LC FREE/LAMBDA SER: 1.88 RATIO
GAMMA GLOB FLD ELPH-MCNC: 1.1 G/DL
GAMMA GLOB MFR SERPL ELPH: 15.9 %
IGA 24H UR QL IFE: NORMAL
IGA SER QL IEP: 358 MG/DL
IGG SER QL IEP: 1247 MG/DL
IGM SER QL IEP: 76 MG/DL
INTERPRETATION SERPL IEP-IMP: NORMAL
KAPPA LC CSF-MCNC: 2.07 MG/DL
KAPPA LC SERPL-MCNC: 3.9 MG/DL
M PROTEIN SPEC IFE-MCNC: NORMAL
PROT SERPL-MCNC: 7.2 G/DL
PROT SERPL-MCNC: 7.2 G/DL

## 2023-12-13 LAB — VIT B1 SERPL-MCNC: 166.7 NMOL/L

## 2023-12-14 ENCOUNTER — APPOINTMENT (OUTPATIENT)
Dept: NEUROLOGY | Facility: CLINIC | Age: 68
End: 2023-12-14
Payer: MEDICARE

## 2023-12-14 LAB — METHYLMALONATE SERPL-SCNC: 185 NMOL/L

## 2023-12-14 PROCEDURE — 95816 EEG AWAKE AND DROWSY: CPT

## 2023-12-15 LAB
AMPA-R ABCBA: NEGATIVE
AMPHIPHYSIN IGG TITR SER IF: NEGATIVE
ANNOTATION COMMENT IMP: NORMAL
CASPR2-IGG CBA, S: NEGATIVE
CV2 IGG TITR SER: NEGATIVE
GABA-B ABCBA: NEGATIVE
GAD65 AB SER-MCNC: 0 NMOL/L
GLIAL NUC TYPE 1 AB TITR SER: NEGATIVE
HU1 AB TITR SER: NEGATIVE
HU2 AB TITR SER IF: NEGATIVE
HU3 AB TITR SER: NEGATIVE
IGLON5 IFA, S: NEGATIVE
IMMUNOLOGIST REVIEW: NORMAL
LGI1-IGG CBA, S: NEGATIVE
NIF IFA, S: NEGATIVE
NMDA-R ABCBA: NEGATIVE
PCA-1 AB TITR SER: NEGATIVE
PCA-2 AB TITR SER: NEGATIVE
PCA-TR AB TITR SER: NEGATIVE

## 2023-12-21 ENCOUNTER — OUTPATIENT (OUTPATIENT)
Dept: OUTPATIENT SERVICES | Facility: HOSPITAL | Age: 68
LOS: 1 days | End: 2023-12-21
Payer: MEDICARE

## 2023-12-21 ENCOUNTER — APPOINTMENT (OUTPATIENT)
Dept: MRI IMAGING | Facility: CLINIC | Age: 68
End: 2023-12-21
Payer: MEDICARE

## 2023-12-21 DIAGNOSIS — G31.84 MILD COGNITIVE IMPAIRMENT OF UNCERTAIN OR UNKNOWN ETIOLOGY: ICD-10-CM

## 2023-12-21 DIAGNOSIS — Z90.411 ACQUIRED PARTIAL ABSENCE OF PANCREAS: Chronic | ICD-10-CM

## 2023-12-21 DIAGNOSIS — Z98.890 OTHER SPECIFIED POSTPROCEDURAL STATES: Chronic | ICD-10-CM

## 2023-12-21 DIAGNOSIS — F40.9 PHOBIC ANXIETY DISORDER, UNSPECIFIED: ICD-10-CM

## 2023-12-21 DIAGNOSIS — Z90.81 ACQUIRED ABSENCE OF SPLEEN: Chronic | ICD-10-CM

## 2023-12-21 DIAGNOSIS — N83.8 OTHER NONINFLAMMATORY DISORDERS OF OVARY, FALLOPIAN TUBE AND BROAD LIGAMENT: Chronic | ICD-10-CM

## 2023-12-21 DIAGNOSIS — Z84.89 FAMILY HISTORY OF OTHER SPECIFIED CONDITIONS: Chronic | ICD-10-CM

## 2023-12-21 PROCEDURE — 70553 MRI BRAIN STEM W/O & W/DYE: CPT

## 2023-12-21 PROCEDURE — A9585: CPT

## 2023-12-21 PROCEDURE — 70553 MRI BRAIN STEM W/O & W/DYE: CPT | Mod: 26,MH

## 2023-12-22 PROBLEM — M89.9 FRONTAL SKULL LESION: Status: ACTIVE | Noted: 2023-12-22

## 2023-12-27 ENCOUNTER — APPOINTMENT (OUTPATIENT)
Dept: CT IMAGING | Facility: CLINIC | Age: 68
End: 2023-12-27
Payer: MEDICARE

## 2023-12-27 ENCOUNTER — OUTPATIENT (OUTPATIENT)
Dept: OUTPATIENT SERVICES | Facility: HOSPITAL | Age: 68
LOS: 1 days | End: 2023-12-27
Payer: MEDICARE

## 2023-12-27 DIAGNOSIS — Z98.890 OTHER SPECIFIED POSTPROCEDURAL STATES: Chronic | ICD-10-CM

## 2023-12-27 DIAGNOSIS — Z90.81 ACQUIRED ABSENCE OF SPLEEN: Chronic | ICD-10-CM

## 2023-12-27 DIAGNOSIS — M89.9 DISORDER OF BONE, UNSPECIFIED: ICD-10-CM

## 2023-12-27 DIAGNOSIS — N83.8 OTHER NONINFLAMMATORY DISORDERS OF OVARY, FALLOPIAN TUBE AND BROAD LIGAMENT: Chronic | ICD-10-CM

## 2023-12-27 DIAGNOSIS — Z90.411 ACQUIRED PARTIAL ABSENCE OF PANCREAS: Chronic | ICD-10-CM

## 2023-12-27 DIAGNOSIS — Z84.89 FAMILY HISTORY OF OTHER SPECIFIED CONDITIONS: Chronic | ICD-10-CM

## 2023-12-27 PROCEDURE — 70450 CT HEAD/BRAIN W/O DYE: CPT

## 2023-12-27 PROCEDURE — 70450 CT HEAD/BRAIN W/O DYE: CPT | Mod: 26,MH

## 2023-12-30 LAB
BASOPHILS # BLD AUTO: 0.08 K/UL
BASOPHILS NFR BLD AUTO: 1 %
EOSINOPHIL # BLD AUTO: 0.13 K/UL
EOSINOPHIL NFR BLD AUTO: 1.6 %
HCT VFR BLD CALC: 40.6 %
HGB BLD-MCNC: 13.4 G/DL
IMM GRANULOCYTES NFR BLD AUTO: 0.2 %
LYMPHOCYTES # BLD AUTO: 3.4 K/UL
LYMPHOCYTES NFR BLD AUTO: 40.9 %
MAN DIFF?: NORMAL
MCHC RBC-ENTMCNC: 31.6 PG
MCHC RBC-ENTMCNC: 33 GM/DL
MCV RBC AUTO: 95.8 FL
MONOCYTES # BLD AUTO: 1.03 K/UL
MONOCYTES NFR BLD AUTO: 12.4 %
NEUTROPHILS # BLD AUTO: 3.65 K/UL
NEUTROPHILS NFR BLD AUTO: 43.9 %
PLATELET # BLD AUTO: 336 K/UL
RBC # BLD: 4.24 M/UL
RBC # FLD: 14.9 %
WBC # FLD AUTO: 8.31 K/UL

## 2024-01-04 ENCOUNTER — NON-APPOINTMENT (OUTPATIENT)
Age: 69
End: 2024-01-04

## 2024-01-18 ENCOUNTER — APPOINTMENT (OUTPATIENT)
Dept: ENDOCRINOLOGY | Facility: CLINIC | Age: 69
End: 2024-01-18
Payer: MEDICARE

## 2024-01-18 VITALS
DIASTOLIC BLOOD PRESSURE: 82 MMHG | SYSTOLIC BLOOD PRESSURE: 110 MMHG | WEIGHT: 127 LBS | BODY MASS INDEX: 21.42 KG/M2 | OXYGEN SATURATION: 98 % | HEART RATE: 73 BPM | HEIGHT: 64.5 IN

## 2024-01-18 DIAGNOSIS — E03.9 HYPOTHYROIDISM, UNSPECIFIED: ICD-10-CM

## 2024-01-18 DIAGNOSIS — R73.09 OTHER ABNORMAL GLUCOSE: ICD-10-CM

## 2024-01-18 PROCEDURE — G2211 COMPLEX E/M VISIT ADD ON: CPT

## 2024-01-18 PROCEDURE — 99214 OFFICE O/P EST MOD 30 MIN: CPT

## 2024-01-19 NOTE — HISTORY OF PRESENT ILLNESS
[FreeTextEntry1] : Patient returns for a follow up visit for hypothyroidism. Since the last visit pt has no significant interval health changes. No interval surgery, hospitalizations, fractures, or change in medications. Pt states anxiety/ panic disorders have been more frequent. Pt had screening for dementia and Alzheimer's.   Pt is on LT4 125 mcg 6 days/week. No sx of hypo or hyperthyroidism. No local neck pain. No dysphagia or dysphonia. No raciness, shakiness, heat/cold intolerance, tiredness, or fatigue. No palpitations, tremors, or sudden weight gain/loss.  Occasional palpitations, had EKG, echo normal. Had benign breast bx. Menopausal x 2 years. No BMD.  Dx: hemochromatosis, began phlebotomy spring 2016, no recent phlebotomy. Sees Dr. Jasper Collins. Hematology Dr. Rudolph.  Had MRI of liver to assess iron content.  Details not available.  H/o stable prediabetes, A1c 6.0%.prior 6.2, 6.1. Changed diet, previously lost about 20 lbs. No FHx diabetes.

## 2024-01-19 NOTE — ASSESSMENT
[Levothyroxine] : The patient was instructed to take Levothyroxine on an empty stomach, separate from vitamins, and wait at least 30 minutes before eating [FreeTextEntry1] : 69 y/o female with hypothyroidism  Pt is on LT4 125 mcg 6 days/week. No sx of hypo or hyperthyroidism. No local neck pain. No dysphagia or dysphonia. No raciness, shakiness, heat/cold intolerance, tiredness, or fatigue. No palpitations, tremors, or sudden weight gain/loss.  H/o prediabetes. Natural history of prediabetes discussed in detail. Pt advised re: watching weight, maintaining moderate to low carbohydrate intake. Controversy concerning use of metformin for prediabetes reviewed. Recent hemoglobin A1c elevated at 6.6%.  More aggressive options again reviewed.  Patient realistically would prefer an attempt at better diet control before initiating medical therapy.  Labs: December 2023 Creatinine: 0.73 Calcium: 10.0 Vitamin D: 69 TSH: 0.40 Free T4: 2.1 A1C: 6.6% (November 2023)  F/u in 1 yr.

## 2024-01-19 NOTE — END OF VISIT
[FreeTextEntry3] : This note was written by Ni Akhtar on (January 18, 2024) acting as a medical scribe for Dr. Arenas. This note was authored by the medical scribe for me. I have reviewed, edited, and revised the note as needed. I am in agreement with the exam findings, imaging findings, and treatment plan.  Oliver Arenas MD

## 2024-06-19 NOTE — H&P PST ADULT - NS PRO FEM REPRO HEALTH SCREEN
Endocrine Refill protocol for Glucose testing supplies       Protocol Criteria:fail  Appointment with Endocrinology completed in the last 12 months or scheduled in the next 6 months     Verify appointment has been completed or scheduled in the appropriate timeline. If so can send a 90 day supply with 1 refill.        Last completed office visit: 3/20/2024 Prachi Montanez MD   Next scheduled Follow up: No future appointments.       mammogram

## 2024-06-25 NOTE — ASU DISCHARGE PLAN (ADULT/PEDIATRIC). - A. DRIVE A CAR, OPERATE POWER TOOLS OR MACHINERY
EXAMINATION TYPE: US venous doppler duplex LE LT

 

DATE OF EXAM: 6/25/2024 2:05 PM

 

COMPARISON: US 2023

 

CLINICAL INDICATION: Female, 42 years old with history of R60.0 EDEMA OF LEFT LOWER LEG L53.9 REDNESS
 R26.2; History of left leg DVT, patient on blood thinners

 

SIDE PERFORMED: Left  

 

TECHNIQUE:  The lower extremity deep venous system is examined utilizing real time linear array sonog
arnaldo with graded compression, doppler sonography and color-flow sonography.

 

VESSELS IMAGED:

Common Femoral Vein

Deep Femoral Vein

Greater Saphenous Vein *

Femoral Vein

Popliteal Vein

Small Saphenous Vein *

Proximal Calf Veins

(* superficial vessels)

 

 

 

Left Leg:  Appears negative for DVT

 

 

 

IMPRESSION:  Grayscale, color doppler, spectral doppler imaging performed of the deep veins of the lo
wer extremities.  There is normal flow, compressibility, vascular waveforms. Statement Selected

## 2024-07-18 ENCOUNTER — APPOINTMENT (OUTPATIENT)
Dept: ENDOCRINOLOGY | Facility: CLINIC | Age: 69
End: 2024-07-18
Payer: MEDICARE

## 2024-07-18 VITALS
SYSTOLIC BLOOD PRESSURE: 122 MMHG | DIASTOLIC BLOOD PRESSURE: 70 MMHG | OXYGEN SATURATION: 99 % | HEART RATE: 97 BPM | BODY MASS INDEX: 21.25 KG/M2 | HEIGHT: 64.5 IN | WEIGHT: 126 LBS

## 2024-07-18 DIAGNOSIS — E03.9 HYPOTHYROIDISM, UNSPECIFIED: ICD-10-CM

## 2024-07-18 DIAGNOSIS — R73.03 PREDIABETES.: ICD-10-CM

## 2024-07-18 DIAGNOSIS — R73.09 OTHER ABNORMAL GLUCOSE: ICD-10-CM

## 2024-07-18 LAB — HBA1C MFR BLD HPLC: 6.1

## 2024-07-18 PROCEDURE — 99214 OFFICE O/P EST MOD 30 MIN: CPT

## 2024-07-18 PROCEDURE — G2211 COMPLEX E/M VISIT ADD ON: CPT

## 2024-07-18 PROCEDURE — 83036 HEMOGLOBIN GLYCOSYLATED A1C: CPT | Mod: QW

## 2024-07-20 LAB
ALBUMIN SERPL ELPH-MCNC: 4.3 G/DL
ALP BLD-CCNC: 80 U/L
ALT SERPL-CCNC: 39 U/L
ANION GAP SERPL CALC-SCNC: 13 MMOL/L
AST SERPL-CCNC: 40 U/L
BILIRUB SERPL-MCNC: 0.5 MG/DL
BUN SERPL-MCNC: 16 MG/DL
CALCIUM SERPL-MCNC: 10.3 MG/DL
CHLORIDE SERPL-SCNC: 99 MMOL/L
CO2 SERPL-SCNC: 29 MMOL/L
CREAT SERPL-MCNC: 0.74 MG/DL
EGFR: 88 ML/MIN/1.73M2
GLUCOSE SERPL-MCNC: 115 MG/DL
POTASSIUM SERPL-SCNC: 4 MMOL/L
PROT SERPL-MCNC: 7.1 G/DL
SODIUM SERPL-SCNC: 141 MMOL/L
T4 FREE SERPL-MCNC: 2.1 NG/DL
TSH SERPL-ACNC: 0.49 UIU/ML

## 2024-08-27 ENCOUNTER — LABORATORY RESULT (OUTPATIENT)
Age: 69
End: 2024-08-27

## 2024-08-27 ENCOUNTER — APPOINTMENT (OUTPATIENT)
Dept: NEUROLOGY | Facility: CLINIC | Age: 69
End: 2024-08-27
Payer: MEDICARE

## 2024-08-27 ENCOUNTER — NON-APPOINTMENT (OUTPATIENT)
Age: 69
End: 2024-08-27

## 2024-08-27 VITALS
HEART RATE: 60 BPM | BODY MASS INDEX: 21.16 KG/M2 | HEIGHT: 65 IN | SYSTOLIC BLOOD PRESSURE: 126 MMHG | DIASTOLIC BLOOD PRESSURE: 70 MMHG | WEIGHT: 127 LBS

## 2024-08-27 DIAGNOSIS — G31.84 MILD COGNITIVE IMPAIRMENT, SO STATED: ICD-10-CM

## 2024-08-27 DIAGNOSIS — M89.38 HYPERTROPHY OF BONE, OTHER SITE: ICD-10-CM

## 2024-08-27 LAB
BASOPHILS # BLD AUTO: 0.07 K/UL
BASOPHILS NFR BLD AUTO: 0.7 %
EOSINOPHIL # BLD AUTO: 0.16 K/UL
EOSINOPHIL NFR BLD AUTO: 1.7 %
HCT VFR BLD CALC: 44.1 %
HGB BLD-MCNC: 14.7 G/DL
IMM GRANULOCYTES NFR BLD AUTO: 0.3 %
LYMPHOCYTES # BLD AUTO: 3.66 K/UL
LYMPHOCYTES NFR BLD AUTO: 38.3 %
MAN DIFF?: NORMAL
MCHC RBC-ENTMCNC: 32.7 PG
MCHC RBC-ENTMCNC: 33.3 GM/DL
MCV RBC AUTO: 98.2 FL
MONOCYTES # BLD AUTO: 1.2 K/UL
MONOCYTES NFR BLD AUTO: 12.6 %
NEUTROPHILS # BLD AUTO: 4.44 K/UL
NEUTROPHILS NFR BLD AUTO: 46.4 %
PLATELET # BLD AUTO: 329 K/UL
RBC # BLD: 4.49 M/UL
RBC # FLD: 14.6 %
WBC # FLD AUTO: 9.56 K/UL

## 2024-08-27 PROCEDURE — 99214 OFFICE O/P EST MOD 30 MIN: CPT

## 2024-08-28 DIAGNOSIS — D72.820 LYMPHOCYTOSIS (SYMPTOMATIC): ICD-10-CM

## 2024-08-28 LAB — IGA 24H UR QL IFE: NORMAL

## 2024-08-29 ENCOUNTER — APPOINTMENT (OUTPATIENT)
Dept: MRI IMAGING | Facility: CLINIC | Age: 69
End: 2024-08-29
Payer: MEDICARE

## 2024-08-29 ENCOUNTER — OUTPATIENT (OUTPATIENT)
Dept: OUTPATIENT SERVICES | Facility: HOSPITAL | Age: 69
LOS: 1 days | End: 2024-08-29
Payer: MEDICARE

## 2024-08-29 DIAGNOSIS — Z98.890 OTHER SPECIFIED POSTPROCEDURAL STATES: Chronic | ICD-10-CM

## 2024-08-29 DIAGNOSIS — Z90.411 ACQUIRED PARTIAL ABSENCE OF PANCREAS: Chronic | ICD-10-CM

## 2024-08-29 DIAGNOSIS — Z84.89 FAMILY HISTORY OF OTHER SPECIFIED CONDITIONS: Chronic | ICD-10-CM

## 2024-08-29 DIAGNOSIS — M89.38 HYPERTROPHY OF BONE, OTHER SITE: ICD-10-CM

## 2024-08-29 DIAGNOSIS — Z90.81 ACQUIRED ABSENCE OF SPLEEN: Chronic | ICD-10-CM

## 2024-08-29 DIAGNOSIS — N83.8 OTHER NONINFLAMMATORY DISORDERS OF OVARY, FALLOPIAN TUBE AND BROAD LIGAMENT: Chronic | ICD-10-CM

## 2024-08-29 LAB
MISCELLANEOUS TEST: NORMAL
PROC NAME: NORMAL

## 2024-08-29 PROCEDURE — 70551 MRI BRAIN STEM W/O DYE: CPT | Mod: 26,MH

## 2024-08-29 PROCEDURE — 70551 MRI BRAIN STEM W/O DYE: CPT

## 2024-08-30 LAB
ALBUMIN MFR SERPL ELPH: 57.3 %
ALBUMIN SERPL-MCNC: 4.2 G/DL
ALBUMIN/GLOB SERPL: 1.3 RATIO
ALPHA1 GLOB MFR SERPL ELPH: 4.1 %
ALPHA1 GLOB SERPL ELPH-MCNC: 0.3 G/DL
ALPHA2 GLOB MFR SERPL ELPH: 9.2 %
ALPHA2 GLOB SERPL ELPH-MCNC: 0.7 G/DL
B-GLOBULIN MFR SERPL ELPH: 13.8 %
B-GLOBULIN SERPL ELPH-MCNC: 1 G/DL
DEPRECATED KAPPA LC FREE/LAMBDA SER: 1.49 RATIO
GAMMA GLOB FLD ELPH-MCNC: 1.2 G/DL
GAMMA GLOB MFR SERPL ELPH: 15.6 %
IGA SER QL IEP: 277 MG/DL
IGG SER QL IEP: 1050 MG/DL
IGM SER QL IEP: 59 MG/DL
INTERPRETATION SERPL IEP-IMP: NORMAL
KAPPA LC CSF-MCNC: 1.78 MG/DL
KAPPA LC SERPL-MCNC: 2.66 MG/DL
M PROTEIN SPEC IFE-MCNC: NORMAL
PROT SERPL-MCNC: 7.4 G/DL
PROT SERPL-MCNC: 7.4 G/DL

## 2024-08-31 ENCOUNTER — TRANSCRIPTION ENCOUNTER (OUTPATIENT)
Age: 69
End: 2024-08-31

## 2024-09-06 LAB
APOE INTERPRETATION: NORMAL
APOE REASON FOR REFERRAL: NORMAL
APOE RELEASED BY: NORMAL
APOE RESULT SUMMARY: NORMAL
APOE RESULT: NORMAL
APOE SPECIMEN: NORMAL

## 2024-09-06 NOTE — HISTORY OF PRESENT ILLNESS
[FreeTextEntry1] : Mrs. Lindsay Amador returned to the office having been initially evaluated on December 7, 2023.  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  At about the same time, she developed anxiety and photophobia, a fear of being alone.  She was treated over the years with clonazepam and fluoxetine 40 mg/day.  She is currently under the care of Dr. Fermín Lagunas who is attempting to taper her clonazepam.  At her December 2023 visit, the following history was obtained: She is  and is a retired .  She has 3 healthy children.  She is very functional.  She is able to dress, bathe, toilet herself, cook, care for home and  and drive.  Over the past year, she has noted nonprogressive short-term memory difficulties.  She has experienced no difficulties with language or hallucinations.  She denies any REM behavioral symptoms.  She is fearful that she might suffer from Alzheimer's disease.  Her father suffered from Alzheimer's disease with onset in his 60s.  She scored 24 out of 30 on MMSE making 3 errors in orientation and 3 in recall.  I was concerned that she might have an early neurodegenerative disorder.  An MRI of the brain revealed mild white matter changes.  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  A follow-up CT of the brain revealed absence of bone destruction with the possibility of hemangioma.  I suggested a follow-up MRI or CT in 6 months.  Her serologic evaluation was notable for a mildly elevated kappa lambda free light chain ratio of 1.88.  She was accompanied to the office by her sister Susana.  She has overwhelming anxiety.  There is some marital stress.  She is independent in all ADLs.  She drives locally but sometimes becomes loss.  She denies REM behavioral symptoms or hallucinations.  She remains on clonazepam 0.75 mg/day in divided doses and fluoxetine 40 mg daily.  Past surgical history is notable for partial pancreatectomy and splenectomy, tonsillectomy, right inguinal herniorrhaphy and several benign breast biopsies/lumpectomies.  She suffers from hyperlipidemia, anxiety and phobic disorders.  There is no history of hypertension, diabetes, cardiac, pulmonary, renal, hepatic, hematologic gastrointestinal, thyroid, hematologic or cerebrovascular disease.  She has no allergies.  Medications include clonazepam and fluoxetine 40 mg/day.  She is a non-smoker and social drinker.  She is  and is a retired teacher.  Family history is notable for father with early onset dementia and a mother with heart disease.  Both her mother and sister suffer from anxiety disorders.

## 2024-09-06 NOTE — PHYSICAL EXAM
[FreeTextEntry1] : Constitutional:  Patient was well-developed, well-nourished and in no acute distress.   Head:  Normocephalic, atraumatic. Tympanic membranes were not examined.   Neck:  Supple with full range of motion.   Cardiovascular:  Cardiac rhythm was regular without murmur. There were no carotid bruits. Peripheral pulses were full and symmetric.   Respiratory:  Lungs were clear.   Abdomen:  Soft and nontender.   Spine:  Nontender.   Skin:  There were no rashes.   NEUROLOGICAL EXAMINATION:  Mental Status: Patient was alert and oriented. Speech was fluent. There was no dysarthria.  She again scored 24 out of 30 on MMSE making 3 errors in recall and 3 in orientation.  Handwriting was not micrographic.  Cranial Nerves:   II: She could finger count bilaterally. Pupils were equal and reactive. Visual fields were full.  Fundi were not visualized.  III, IV, VI:  Eye movements were full without nystagmus.   V: Facial sensation was intact.   VII: Facial strength was normal.   VIII: Hearing was equal.   IX, X: Palatal movement was normal. Phonation was normal.   XI: Sternocleidomastoids and trapezii were normal.   XII: Tongue was midline and movements normal. There was no lingual atrophy or fasciculations.   Motor Examination: Muscle bulk, tone and strength were normal.   Sensory Examination: Pinprick and joint position sense were intact.  Vibration was diminished in the feet.  Reflexes: DTRs were 2+ throughout.   Plantar Responses: Plantar responses were flexor.   Coordination/Cerebellar Function: There was no dysmetria on finger to nose or heel to shin testing.   Gait/Stance: Gait and tandem were normal. Romberg was negative.

## 2024-09-06 NOTE — CONSULT LETTER
[Consult Letter:] : I had the pleasure of evaluating your patient, [unfilled]. [Please see my note below.] : Please see my note below. [Consult Closing:] : Thank you very much for allowing me to participate in the care of this patient.  If you have any questions, please do not hesitate to contact me. [Sincerely,] : Sincerely, [DrAna M  ___] : Dr. BLACK [DrAna M ___] : Dr. BLACK [FreeTextEntry3] : Tino Garcia M.D.

## 2024-10-09 DIAGNOSIS — N60.02 SOLITARY CYST OF RIGHT BREAST: ICD-10-CM

## 2024-10-09 DIAGNOSIS — N60.01 SOLITARY CYST OF RIGHT BREAST: ICD-10-CM

## 2024-10-29 ENCOUNTER — NON-APPOINTMENT (OUTPATIENT)
Age: 69
End: 2024-10-29

## 2024-10-29 ENCOUNTER — APPOINTMENT (OUTPATIENT)
Dept: SURGICAL ONCOLOGY | Facility: CLINIC | Age: 69
End: 2024-10-29
Payer: MEDICARE

## 2024-10-29 VITALS
OXYGEN SATURATION: 98 % | HEART RATE: 71 BPM | HEIGHT: 65 IN | RESPIRATION RATE: 16 BRPM | WEIGHT: 128 LBS | DIASTOLIC BLOOD PRESSURE: 75 MMHG | BODY MASS INDEX: 21.33 KG/M2 | SYSTOLIC BLOOD PRESSURE: 120 MMHG

## 2024-10-29 DIAGNOSIS — N60.02 SOLITARY CYST OF RIGHT BREAST: ICD-10-CM

## 2024-10-29 DIAGNOSIS — N60.01 SOLITARY CYST OF RIGHT BREAST: ICD-10-CM

## 2024-10-29 PROCEDURE — 99213 OFFICE O/P EST LOW 20 MIN: CPT

## 2025-01-01 NOTE — ASSESSMENT
[FreeTextEntry1] : Mrs. Amador is a 68-year-old with a longstanding anxiety and phobic disorders.  She continues to exhibit significant amnestic symptoms.  I had a long discussion with her regarding the possibility of an underlying neurodegenerative disorder like Alzheimer's disease.  I suggested considering undergoing an Amyvid PET scan, APO E genotyping and P tau testing.   She will discuss these options with her psychiatrist Dr. Lagunas particularly if she would consider treatment with lecanemab, an amyloid scavenger.  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  She will also undergo follow-up immunoglobulin studies.  Further management will depend upon these results and her clinical course.  I suggested close telephone and office follow-up.
[FreeTextEntry1] : Mrs. Amador is a 68-year-old with a longstanding anxiety and phobic disorders.  She continues to exhibit significant amnestic symptoms.  I had a long discussion with her regarding the possibility of an underlying neurodegenerative disorder like Alzheimer's disease.  I suggested considering undergoing an Amyvid PET scan, APO E genotyping and P tau testing.   She will discuss these options with her psychiatrist Dr. Lagunas particularly if she would consider treatment with lecanemab, an amyloid scavenger.  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  She will also undergo follow-up immunoglobulin studies.  Further management will depend upon these results and her clinical course.  I suggested close telephone and office follow-up.
.

## 2025-02-05 ENCOUNTER — APPOINTMENT (OUTPATIENT)
Dept: NEUROLOGY | Facility: CLINIC | Age: 70
End: 2025-02-05
Payer: MEDICARE

## 2025-02-05 VITALS
SYSTOLIC BLOOD PRESSURE: 134 MMHG | DIASTOLIC BLOOD PRESSURE: 77 MMHG | HEIGHT: 65 IN | HEART RATE: 76 BPM | WEIGHT: 127 LBS | BODY MASS INDEX: 21.16 KG/M2

## 2025-02-05 DIAGNOSIS — G31.84 MILD COGNITIVE IMPAIRMENT, SO STATED: ICD-10-CM

## 2025-02-05 PROCEDURE — 99213 OFFICE O/P EST LOW 20 MIN: CPT

## 2025-02-10 ENCOUNTER — APPOINTMENT (OUTPATIENT)
Dept: ENDOCRINOLOGY | Facility: CLINIC | Age: 70
End: 2025-02-10
Payer: MEDICARE

## 2025-02-10 VITALS
WEIGHT: 128 LBS | HEIGHT: 65 IN | BODY MASS INDEX: 21.33 KG/M2 | OXYGEN SATURATION: 98 % | HEART RATE: 71 BPM | SYSTOLIC BLOOD PRESSURE: 118 MMHG | DIASTOLIC BLOOD PRESSURE: 70 MMHG

## 2025-02-10 DIAGNOSIS — E03.9 HYPOTHYROIDISM, UNSPECIFIED: ICD-10-CM

## 2025-02-10 DIAGNOSIS — R73.03 PREDIABETES.: ICD-10-CM

## 2025-02-10 PROCEDURE — 83036 HEMOGLOBIN GLYCOSYLATED A1C: CPT | Mod: QW

## 2025-02-10 PROCEDURE — G2211 COMPLEX E/M VISIT ADD ON: CPT

## 2025-02-10 PROCEDURE — 99214 OFFICE O/P EST MOD 30 MIN: CPT

## 2025-02-11 LAB
ALBUMIN SERPL ELPH-MCNC: 4 G/DL
ALP BLD-CCNC: 94 U/L
ALT SERPL-CCNC: 29 U/L
ANION GAP SERPL CALC-SCNC: 18 MMOL/L
AST SERPL-CCNC: 33 U/L
BILIRUB SERPL-MCNC: 0.4 MG/DL
BUN SERPL-MCNC: 13 MG/DL
CALCIUM SERPL-MCNC: 9.5 MG/DL
CHLORIDE SERPL-SCNC: 99 MMOL/L
CO2 SERPL-SCNC: 22 MMOL/L
CREAT SERPL-MCNC: 0.62 MG/DL
EGFR: 96 ML/MIN/1.73M2
GLUCOSE SERPL-MCNC: 108 MG/DL
HBA1C MFR BLD HPLC: 6.3
POTASSIUM SERPL-SCNC: 4.4 MMOL/L
PROT SERPL-MCNC: 6.4 G/DL
SODIUM SERPL-SCNC: 139 MMOL/L
T4 FREE SERPL-MCNC: 1.9 NG/DL
TSH SERPL-ACNC: 1.35 UIU/ML

## 2025-02-19 RX ORDER — DONEPEZIL HYDROCHLORIDE 5 MG/1
5 TABLET ORAL
Qty: 90 | Refills: 3 | Status: ACTIVE | COMMUNITY
Start: 2025-02-19 | End: 1900-01-01

## 2025-03-11 RX ORDER — RIVASTIGMINE 4.6 MG/24H
4.6 PATCH, EXTENDED RELEASE TRANSDERMAL
Qty: 90 | Refills: 3 | Status: ACTIVE | COMMUNITY
Start: 2025-03-11 | End: 1900-01-01

## 2025-05-08 RX ORDER — METFORMIN HYDROCHLORIDE 500 MG/1
500 TABLET, COATED ORAL TWICE DAILY
Qty: 180 | Refills: 3 | Status: ACTIVE | COMMUNITY
Start: 2025-05-08 | End: 1900-01-01

## 2025-06-03 ENCOUNTER — NON-APPOINTMENT (OUTPATIENT)
Age: 70
End: 2025-06-03

## 2025-06-05 ENCOUNTER — RX RENEWAL (OUTPATIENT)
Age: 70
End: 2025-06-05

## 2025-08-13 ENCOUNTER — NON-APPOINTMENT (OUTPATIENT)
Age: 70
End: 2025-08-13

## 2025-08-15 ENCOUNTER — APPOINTMENT (OUTPATIENT)
Dept: ENDOCRINOLOGY | Facility: CLINIC | Age: 70
End: 2025-08-15